# Patient Record
Sex: MALE | Race: WHITE | NOT HISPANIC OR LATINO | URBAN - METROPOLITAN AREA
[De-identification: names, ages, dates, MRNs, and addresses within clinical notes are randomized per-mention and may not be internally consistent; named-entity substitution may affect disease eponyms.]

---

## 2018-06-12 ENCOUNTER — EMERGENCY (EMERGENCY)
Facility: HOSPITAL | Age: 83
LOS: 1 days | Discharge: HOME | End: 2018-06-12
Admitting: EMERGENCY MEDICINE
Payer: MEDICARE

## 2018-06-12 VITALS
HEART RATE: 90 BPM | TEMPERATURE: 98 F | RESPIRATION RATE: 16 BRPM | SYSTOLIC BLOOD PRESSURE: 162 MMHG | DIASTOLIC BLOOD PRESSURE: 90 MMHG

## 2018-06-12 DIAGNOSIS — N40.0 BENIGN PROSTATIC HYPERPLASIA WITHOUT LOWER URINARY TRACT SYMPTOMS: Chronic | ICD-10-CM

## 2018-06-12 DIAGNOSIS — Z98.89 OTHER SPECIFIED POSTPROCEDURAL STATES: Chronic | ICD-10-CM

## 2018-06-12 DIAGNOSIS — Z90.79 ACQUIRED ABSENCE OF OTHER GENITAL ORGAN(S): Chronic | ICD-10-CM

## 2018-06-12 DIAGNOSIS — Z98.49 CATARACT EXTRACTION STATUS, UNSPECIFIED EYE: Chronic | ICD-10-CM

## 2018-06-12 PROCEDURE — 99283 EMERGENCY DEPT VISIT LOW MDM: CPT

## 2018-06-12 NOTE — ED PROVIDER NOTE - OBJECTIVE STATEMENT
86 y/o male c/o pain to old surgical wound. Pt follows with Dr. Rausch who advised pt to present to ER for eval. Pt admits to pain to area, denies drinaage, n/v/d, fever or chills.

## 2018-06-12 NOTE — ED ADULT TRIAGE NOTE - CHIEF COMPLAINT QUOTE
Pt c/o infected suture line to abd.  S/P cholecystectomy 2 years ago.  Sent by Dr. Rausch.  Denies fever or drainage.

## 2018-06-12 NOTE — ED PROVIDER NOTE - MEDICAL DECISION MAKING DETAILS
84 y/o male w/ abscess to old surgical wound- pt seen and eval by Dr. Rausch in ER, preformed I&D. Pt is stable for dc, does not rec abx. pt to f/u with Dr. Rausch

## 2018-06-12 NOTE — ED PROVIDER NOTE - PMH
Diabetes mellitus    Diabetes mellitus type 2 in nonobese    ED (erectile dysfunction)    Gout    HLD (hyperlipidemia)    Hyperlipidemia    Hypertension    Prostate CA    Prostate cancer

## 2018-06-12 NOTE — CONSULT NOTE ADULT - SUBJECTIVE AND OBJECTIVE BOX
Patient had an open cholecystectomy in April of 2016. Developed an area of intermittent drainage from the [id number] of the incision three months ago, and a second area over the medial end of the incision two weeks ago that got progressively more swollen and painful.  Physical Exam: Afebrile.  Abdomen: 1) Erythematous fluctuant area over the medial end of the incision. @) Area of breakdown over the midportion of the incision.  Impression: Two suture granulomas; one with an abscess.  Procedure: 1% lidocaine infiltrated over both areas. Two prolene sutures removed- one from each area. Purulent material drained from the medial site. Skin left open over at both sites.  Plan: Shower tomorrow. Daily dressing changes.

## 2020-11-15 ENCOUNTER — INPATIENT (INPATIENT)
Facility: HOSPITAL | Age: 85
LOS: 1 days | Discharge: HOME | End: 2020-11-17
Attending: SPECIALIST | Admitting: SPECIALIST
Payer: MEDICARE

## 2020-11-15 VITALS
SYSTOLIC BLOOD PRESSURE: 176 MMHG | TEMPERATURE: 98 F | HEART RATE: 103 BPM | RESPIRATION RATE: 18 BRPM | HEIGHT: 70 IN | DIASTOLIC BLOOD PRESSURE: 105 MMHG | OXYGEN SATURATION: 98 %

## 2020-11-15 DIAGNOSIS — Z90.79 ACQUIRED ABSENCE OF OTHER GENITAL ORGAN(S): Chronic | ICD-10-CM

## 2020-11-15 DIAGNOSIS — N40.0 BENIGN PROSTATIC HYPERPLASIA WITHOUT LOWER URINARY TRACT SYMPTOMS: Chronic | ICD-10-CM

## 2020-11-15 DIAGNOSIS — Z98.49 CATARACT EXTRACTION STATUS, UNSPECIFIED EYE: Chronic | ICD-10-CM

## 2020-11-15 DIAGNOSIS — Z90.49 ACQUIRED ABSENCE OF OTHER SPECIFIED PARTS OF DIGESTIVE TRACT: Chronic | ICD-10-CM

## 2020-11-15 DIAGNOSIS — Z98.89 OTHER SPECIFIED POSTPROCEDURAL STATES: Chronic | ICD-10-CM

## 2020-11-15 DIAGNOSIS — R10.9 UNSPECIFIED ABDOMINAL PAIN: ICD-10-CM

## 2020-11-15 LAB
ALBUMIN SERPL ELPH-MCNC: 3.9 G/DL — SIGNIFICANT CHANGE UP (ref 3.3–5)
ALP SERPL-CCNC: 83 U/L — SIGNIFICANT CHANGE UP (ref 40–120)
ALT FLD-CCNC: 10 U/L — SIGNIFICANT CHANGE UP (ref 4–41)
ANION GAP SERPL CALC-SCNC: 11 MMO/L — SIGNIFICANT CHANGE UP (ref 7–14)
APPEARANCE UR: CLEAR — SIGNIFICANT CHANGE UP
AST SERPL-CCNC: 14 U/L — SIGNIFICANT CHANGE UP (ref 4–40)
BACTERIA # UR AUTO: NEGATIVE — SIGNIFICANT CHANGE UP
BASE EXCESS BLDV CALC-SCNC: 0.9 MMOL/L — SIGNIFICANT CHANGE UP
BASOPHILS # BLD AUTO: 0.05 K/UL — SIGNIFICANT CHANGE UP (ref 0–0.2)
BASOPHILS NFR BLD AUTO: 0.7 % — SIGNIFICANT CHANGE UP (ref 0–2)
BILIRUB SERPL-MCNC: 0.4 MG/DL — SIGNIFICANT CHANGE UP (ref 0.2–1.2)
BILIRUB UR-MCNC: NEGATIVE — SIGNIFICANT CHANGE UP
BLOOD GAS VENOUS - CREATININE: 1.8 MG/DL — HIGH (ref 0.5–1.3)
BLOOD GAS VENOUS - FIO2: 21 — SIGNIFICANT CHANGE UP
BLOOD UR QL VISUAL: NEGATIVE — SIGNIFICANT CHANGE UP
BUN SERPL-MCNC: 48 MG/DL — HIGH (ref 7–23)
CALCIUM SERPL-MCNC: 9.5 MG/DL — SIGNIFICANT CHANGE UP (ref 8.4–10.5)
CHLORIDE BLDV-SCNC: 104 MMOL/L — SIGNIFICANT CHANGE UP (ref 96–108)
CHLORIDE SERPL-SCNC: 99 MMOL/L — SIGNIFICANT CHANGE UP (ref 98–107)
CO2 SERPL-SCNC: 22 MMOL/L — SIGNIFICANT CHANGE UP (ref 22–31)
COLOR SPEC: YELLOW — SIGNIFICANT CHANGE UP
CREAT SERPL-MCNC: 1.78 MG/DL — HIGH (ref 0.5–1.3)
EOSINOPHIL # BLD AUTO: 0.09 K/UL — SIGNIFICANT CHANGE UP (ref 0–0.5)
EOSINOPHIL NFR BLD AUTO: 1.2 % — SIGNIFICANT CHANGE UP (ref 0–6)
GAS PNL BLDV: 133 MMOL/L — LOW (ref 136–146)
GLUCOSE BLDC GLUCOMTR-MCNC: 116 MG/DL — HIGH (ref 70–99)
GLUCOSE BLDC GLUCOMTR-MCNC: 135 MG/DL — HIGH (ref 70–99)
GLUCOSE BLDC GLUCOMTR-MCNC: 161 MG/DL — HIGH (ref 70–99)
GLUCOSE BLDC GLUCOMTR-MCNC: 92 MG/DL — SIGNIFICANT CHANGE UP (ref 70–99)
GLUCOSE BLDV-MCNC: 153 MG/DL — HIGH (ref 70–99)
GLUCOSE SERPL-MCNC: 171 MG/DL — HIGH (ref 70–99)
GLUCOSE UR-MCNC: NEGATIVE — SIGNIFICANT CHANGE UP
HCO3 BLDV-SCNC: 24 MMOL/L — SIGNIFICANT CHANGE UP (ref 20–27)
HCT VFR BLD CALC: 35.8 % — LOW (ref 39–50)
HCT VFR BLDV CALC: 38.3 % — LOW (ref 39–51)
HGB BLD-MCNC: 11.9 G/DL — LOW (ref 13–17)
HGB BLDV-MCNC: 12.5 G/DL — LOW (ref 13–17)
HYALINE CASTS # UR AUTO: NEGATIVE — SIGNIFICANT CHANGE UP
IMM GRANULOCYTES NFR BLD AUTO: 0.5 % — SIGNIFICANT CHANGE UP (ref 0–1.5)
KETONES UR-MCNC: NEGATIVE — SIGNIFICANT CHANGE UP
LACTATE BLDV-MCNC: 1.2 MMOL/L — SIGNIFICANT CHANGE UP (ref 0.5–2)
LEUKOCYTE ESTERASE UR-ACNC: NEGATIVE — SIGNIFICANT CHANGE UP
LIDOCAIN IGE QN: 31.3 U/L — SIGNIFICANT CHANGE UP (ref 7–60)
LYMPHOCYTES # BLD AUTO: 1.12 K/UL — SIGNIFICANT CHANGE UP (ref 1–3.3)
LYMPHOCYTES # BLD AUTO: 15.2 % — SIGNIFICANT CHANGE UP (ref 13–44)
MCHC RBC-ENTMCNC: 28.3 PG — SIGNIFICANT CHANGE UP (ref 27–34)
MCHC RBC-ENTMCNC: 33.2 % — SIGNIFICANT CHANGE UP (ref 32–36)
MCV RBC AUTO: 85.2 FL — SIGNIFICANT CHANGE UP (ref 80–100)
MONOCYTES # BLD AUTO: 0.54 K/UL — SIGNIFICANT CHANGE UP (ref 0–0.9)
MONOCYTES NFR BLD AUTO: 7.3 % — SIGNIFICANT CHANGE UP (ref 2–14)
NEUTROPHILS # BLD AUTO: 5.55 K/UL — SIGNIFICANT CHANGE UP (ref 1.8–7.4)
NEUTROPHILS NFR BLD AUTO: 75.1 % — SIGNIFICANT CHANGE UP (ref 43–77)
NITRITE UR-MCNC: NEGATIVE — SIGNIFICANT CHANGE UP
NRBC # FLD: 0 K/UL — SIGNIFICANT CHANGE UP (ref 0–0)
PCO2 BLDV: 43 MMHG — SIGNIFICANT CHANGE UP (ref 41–51)
PH BLDV: 7.39 PH — SIGNIFICANT CHANGE UP (ref 7.32–7.43)
PH UR: 6.5 — SIGNIFICANT CHANGE UP (ref 5–8)
PLATELET # BLD AUTO: 211 K/UL — SIGNIFICANT CHANGE UP (ref 150–400)
PMV BLD: 10.5 FL — SIGNIFICANT CHANGE UP (ref 7–13)
PO2 BLDV: 19 MMHG — LOW (ref 35–40)
POTASSIUM BLDV-SCNC: 4.5 MMOL/L — SIGNIFICANT CHANGE UP (ref 3.4–4.5)
POTASSIUM SERPL-MCNC: 4.9 MMOL/L — SIGNIFICANT CHANGE UP (ref 3.5–5.3)
POTASSIUM SERPL-SCNC: 4.9 MMOL/L — SIGNIFICANT CHANGE UP (ref 3.5–5.3)
PROT SERPL-MCNC: 7.8 G/DL — SIGNIFICANT CHANGE UP (ref 6–8.3)
PROT UR-MCNC: 70 — SIGNIFICANT CHANGE UP
RBC # BLD: 4.2 M/UL — SIGNIFICANT CHANGE UP (ref 4.2–5.8)
RBC # FLD: 15.2 % — HIGH (ref 10.3–14.5)
RBC CASTS # UR COMP ASSIST: SIGNIFICANT CHANGE UP (ref 0–?)
SAO2 % BLDV: 26.3 % — LOW (ref 60–85)
SARS-COV-2 RNA SPEC QL NAA+PROBE: SIGNIFICANT CHANGE UP
SODIUM SERPL-SCNC: 132 MMOL/L — LOW (ref 135–145)
SP GR SPEC: 1.01 — SIGNIFICANT CHANGE UP (ref 1–1.04)
SQUAMOUS # UR AUTO: SIGNIFICANT CHANGE UP
UROBILINOGEN FLD QL: NORMAL — SIGNIFICANT CHANGE UP
WBC # BLD: 7.39 K/UL — SIGNIFICANT CHANGE UP (ref 3.8–10.5)
WBC # FLD AUTO: 7.39 K/UL — SIGNIFICANT CHANGE UP (ref 3.8–10.5)
WBC UR QL: SIGNIFICANT CHANGE UP (ref 0–?)

## 2020-11-15 PROCEDURE — 99222 1ST HOSP IP/OBS MODERATE 55: CPT

## 2020-11-15 PROCEDURE — 74177 CT ABD & PELVIS W/CONTRAST: CPT | Mod: 26

## 2020-11-15 PROCEDURE — 99284 EMERGENCY DEPT VISIT MOD MDM: CPT

## 2020-11-15 PROCEDURE — 99232 SBSQ HOSP IP/OBS MODERATE 35: CPT

## 2020-11-15 RX ORDER — SODIUM CHLORIDE 9 MG/ML
1000 INJECTION, SOLUTION INTRAVENOUS
Refills: 0 | Status: DISCONTINUED | OUTPATIENT
Start: 2020-11-15 | End: 2020-11-17

## 2020-11-15 RX ORDER — DEXTROSE 50 % IN WATER 50 %
15 SYRINGE (ML) INTRAVENOUS ONCE
Refills: 0 | Status: DISCONTINUED | OUTPATIENT
Start: 2020-11-15 | End: 2020-11-17

## 2020-11-15 RX ORDER — SODIUM CHLORIDE 9 MG/ML
1000 INJECTION INTRAMUSCULAR; INTRAVENOUS; SUBCUTANEOUS ONCE
Refills: 0 | Status: COMPLETED | OUTPATIENT
Start: 2020-11-15 | End: 2020-11-15

## 2020-11-15 RX ORDER — LISINOPRIL 2.5 MG/1
10 TABLET ORAL DAILY
Refills: 0 | Status: DISCONTINUED | OUTPATIENT
Start: 2020-11-15 | End: 2020-11-17

## 2020-11-15 RX ORDER — GLUCAGON INJECTION, SOLUTION 0.5 MG/.1ML
1 INJECTION, SOLUTION SUBCUTANEOUS ONCE
Refills: 0 | Status: DISCONTINUED | OUTPATIENT
Start: 2020-11-15 | End: 2020-11-17

## 2020-11-15 RX ORDER — INSULIN LISPRO 100/ML
VIAL (ML) SUBCUTANEOUS EVERY 6 HOURS
Refills: 0 | Status: DISCONTINUED | OUTPATIENT
Start: 2020-11-15 | End: 2020-11-16

## 2020-11-15 RX ORDER — SODIUM CHLORIDE 9 MG/ML
1000 INJECTION, SOLUTION INTRAVENOUS
Refills: 0 | Status: DISCONTINUED | OUTPATIENT
Start: 2020-11-15 | End: 2020-11-16

## 2020-11-15 RX ORDER — ENOXAPARIN SODIUM 100 MG/ML
40 INJECTION SUBCUTANEOUS DAILY
Refills: 0 | Status: DISCONTINUED | OUTPATIENT
Start: 2020-11-15 | End: 2020-11-17

## 2020-11-15 RX ORDER — DEXTROSE 50 % IN WATER 50 %
12.5 SYRINGE (ML) INTRAVENOUS ONCE
Refills: 0 | Status: DISCONTINUED | OUTPATIENT
Start: 2020-11-15 | End: 2020-11-17

## 2020-11-15 RX ORDER — DEXTROSE 50 % IN WATER 50 %
25 SYRINGE (ML) INTRAVENOUS ONCE
Refills: 0 | Status: DISCONTINUED | OUTPATIENT
Start: 2020-11-15 | End: 2020-11-17

## 2020-11-15 RX ORDER — INFLUENZA VIRUS VACCINE 15; 15; 15; 15 UG/.5ML; UG/.5ML; UG/.5ML; UG/.5ML
0.5 SUSPENSION INTRAMUSCULAR ONCE
Refills: 0 | Status: DISCONTINUED | OUTPATIENT
Start: 2020-11-15 | End: 2020-11-17

## 2020-11-15 RX ORDER — PANTOPRAZOLE SODIUM 20 MG/1
40 TABLET, DELAYED RELEASE ORAL DAILY
Refills: 0 | Status: DISCONTINUED | OUTPATIENT
Start: 2020-11-15 | End: 2020-11-17

## 2020-11-15 RX ORDER — LISINOPRIL 2.5 MG/1
10 TABLET ORAL DAILY
Refills: 0 | Status: DISCONTINUED | OUTPATIENT
Start: 2020-11-15 | End: 2020-11-15

## 2020-11-15 RX ADMIN — SODIUM CHLORIDE 120 MILLILITER(S): 9 INJECTION, SOLUTION INTRAVENOUS at 13:36

## 2020-11-15 RX ADMIN — SODIUM CHLORIDE 1000 MILLILITER(S): 9 INJECTION INTRAMUSCULAR; INTRAVENOUS; SUBCUTANEOUS at 05:13

## 2020-11-15 RX ADMIN — LISINOPRIL 10 MILLIGRAM(S): 2.5 TABLET ORAL at 14:53

## 2020-11-15 NOTE — ED ADULT TRIAGE NOTE - CHIEF COMPLAINT QUOTE
Pt reporting to the ED for RUQ pain for 1 hour waking him from sleep. Pt had US of kidney 2 weeks ago due to elevated BUN and creatinine. Pt states "right kidney is no good". Denies dysuria, n/v/d. PMH of HTN, HLD, DM (), HLD, Prostate cancer, not on chemo or radiation at this time.

## 2020-11-15 NOTE — ED PROVIDER NOTE - PSH
BPH (benign prostatic hypertrophy)  S/P Greenlight laser of the prostate  H/O cystoscopy    S/P cataract surgery  left eye  S/P TURP

## 2020-11-15 NOTE — ED ADULT NURSE REASSESSMENT NOTE - NS ED NURSE REASSESS COMMENT FT1
Received pt from previous RN pt a/ox4,  states he wants to go home. Pt speaking in full sentences, breathing unlabored, calm and cooperative, 20g IV LAC patent and no pain at site. Bed in lowest position, surg MD at bedside. Will continue to assess.

## 2020-11-15 NOTE — ED PROVIDER NOTE - ATTENDING CONTRIBUTION TO CARE
Attending Attestation: Dr. Mcclain  I have personally performed a history and physical examination of the patient and discussed management with the resident as well as the patient.  I reviewed the resident's note and agree with the documented findings and plan of care.  I have authored and modified critical sections of the Provider Note, including but not limited to HPI, Physical Exam and MDM. 87 yr old M PMHx includes prostate ca, s/p cholecystectomy 4 yrs ago p/w sharp RUQ pain x1hr PTA. DDx includes PUD, gastritis, pancreatitis, renal colic, less likely cardiac/vascular in nature. Plan: labs, CT A/P, pain control, IVF, reassess.

## 2020-11-15 NOTE — CONSULT NOTE ADULT - ASSESSMENT
Impression:     Impression:    #Gastric wall thickening: Suspect peptic ulcer disease vs. malignancy vs. less likely infiltrative disease; less likely cause of acute onset abdominal pain.    #Intraabdominal enhancing lesion  #Type 2 diabetes    Recommendations:  - keep NPO at midnight for upper endoscopy with gastric biopsy tomorrow  - start pantoprazole 40 mg IV daily  - pain control/supportive care per primary team      Zina Barfield PGY-5  Gastroenterology Fellow  Page #[phone]   Page #[phone] 5pm-7am on weekdays, and on weekends   Impression:    #Gastric wall thickening: Suspect peptic ulcer disease vs. malignancy vs. less likely infiltrative disease; less likely cause of acute onset abdominal pain.    #Intraabdominal enhancing lesion  #Type 2 diabetes    Recommendations:  - keep NPO at midnight for upper endoscopy +/- EUS tomorrow  - start pantoprazole 40 mg IV daily  - pain control/supportive care per primary team      Zina Barfield PGY-5  Gastroenterology Fellow  Page #[phone]   Page #[phone] 5pm-7am on weekdays, and on weekends

## 2020-11-15 NOTE — H&P ADULT - NSICDXPASTSURGICALHX_GEN_ALL_CORE_FT
PAST SURGICAL HISTORY:  BPH (benign prostatic hypertrophy) S/P Greenlight laser of the prostate    H/O cystoscopy     S/P cataract surgery left eye    S/P cholecystectomy Open 2016    S/P TURP

## 2020-11-15 NOTE — ED PROVIDER NOTE - CLINICAL SUMMARY MEDICAL DECISION MAKING FREE TEXT BOX
Arash, PGY2 - 87M PMH includes prostate ca, s/p cholecystectomy remotely p/w RUQ pain x1hr PTA. DDx includes PUD, gastritis, pancreatitis, renal colic, less likely cardiac/vascular in nature. Plan: labs, CT A/P 87 yr old M PMHx includes prostate ca, s/p cholecystectomy 4 yrs ago p/w sharp RUQ pain x1hr PTA. DDx includes PUD, gastritis, pancreatitis, renal colic, less likely cardiac/vascular in nature. Plan: labs, CT A/P, pain control, IVF, reassess.

## 2020-11-15 NOTE — ED ADULT NURSE NOTE - INTERVENTIONS DEFINITIONS
Call bell, personal items and telephone within reach/Monitor for mental status changes and reorient to person, place, and time/Non-slip footwear when patient is off stretcher/Monitor gait and stability/Stretcher in lowest position, wheels locked, appropriate side rails in place/[place] to call system/Instruct patient to call for assistance

## 2020-11-15 NOTE — ED PROVIDER NOTE - PHYSICAL EXAMINATION
I have reviewed the triage vital signs.  Const: AAOx3, in NAD  Eyes: no conjunctival injection  HENT: NCAT, Neck supple, oral mucosa moist  CV: RRR, +S1, S2  Resp: CTAB, no respiratory distress  GI: Abdomen soft, +RUQ TTP, no guarding, no CVA tenderness  Extremities: No peripheral edema,  2+ radial and DP pulses  Skin: Warm, well perfused, no rash  MSK: No gross deformities appreciated  Neuro: No focal sensory or motor deficits  Psych: Appropriate mood and affect

## 2020-11-15 NOTE — ED PROVIDER NOTE - NS ED ROS FT
General: Denies fevers or chills  Eyes: Denies vision changes  ENMT: Denies trouble swallowing or speaking, or sore throat  CV: Denies chest pain or palpitations  Resp: Denies cough or SOB  GI: +Abdominal pain. No nausea, vomiting, diarrhea, or constipation   : Denies painful urination, increased urinary frequency, or blood in urine  Skin: Denies new rashes  Neuro: Denies headache, numbness, or weakness  MSK: Denies recent trauma

## 2020-11-15 NOTE — CONSULT NOTE ADULT - ATTENDING COMMENTS
88 yo M s/p pacheco presenting with abdominal pain found to have abnormal CT scan with gastric wall thickening and 5.8 cm potential lesion in the perihepatic space.  Unclear which is primary, if truly malignant.  Need EGD +/- EUS.

## 2020-11-15 NOTE — PATIENT PROFILE ADULT - HAVE YOU EXPERIENCED VIOLENCE OR A TRAUMATIC EVENT?
no [Well Nourished] : well nourished [No Acute Distress] : no acute distress [Well-Appearing] : well-appearing [Well Developed] : well developed [PERRL] : pupils equal round and reactive to light [Normal Sclera/Conjunctiva] : normal sclera/conjunctiva [Normal Outer Ear/Nose] : the outer ears and nose were normal in appearance [EOMI] : extraocular movements intact [Normal Oropharynx] : the oropharynx was normal [No Lymphadenopathy] : no lymphadenopathy [No JVD] : no jugular venous distention [Supple] : supple [Thyroid Normal, No Nodules] : the thyroid was normal and there were no nodules present [No Respiratory Distress] : no respiratory distress  [No Accessory Muscle Use] : no accessory muscle use [Clear to Auscultation] : lungs were clear to auscultation bilaterally [Regular Rhythm] : with a regular rhythm [Normal Rate] : normal rate  [Normal S1, S2] : normal S1 and S2 [No Murmur] : no murmur heard [No Carotid Bruits] : no carotid bruits [No Abdominal Bruit] : a ~M bruit was not heard ~T in the abdomen [No Varicosities] : no varicosities [Pedal Pulses Present] : the pedal pulses are present [No Edema] : there was no peripheral edema [No Palpable Aorta] : no palpable aorta [No Extremity Clubbing/Cyanosis] : no extremity clubbing/cyanosis [Non Tender] : non-tender [Soft] : abdomen soft [Non-distended] : non-distended [No Masses] : no abdominal mass palpated [No HSM] : no HSM [Normal Bowel Sounds] : normal bowel sounds [Normal Anterior Cervical Nodes] : no anterior cervical lymphadenopathy [Normal Posterior Cervical Nodes] : no posterior cervical lymphadenopathy [No CVA Tenderness] : no CVA  tenderness [No Spinal Tenderness] : no spinal tenderness [No Joint Swelling] : no joint swelling [Grossly Normal Strength/Tone] : grossly normal strength/tone [No Rash] : no rash [Coordination Grossly Intact] : coordination grossly intact [No Focal Deficits] : no focal deficits [Normal Gait] : normal gait [Deep Tendon Reflexes (DTR)] : deep tendon reflexes were 2+ and symmetric [Normal Insight/Judgement] : insight and judgment were intact [Normal Affect] : the affect was normal [de-identified] : syndactylyl [de-identified] : Flattened nasal bridge

## 2020-11-15 NOTE — H&P ADULT - ASSESSMENT
87 Year old male with history of DM 2, HLD, Prostate CA s/p TURP, open cholecystomy (4/2016 by Dr. Rausch), presents with 1 day of RUQ pain, now improved, found to have 5.7 x 1.7 x 5.8 cm enhancing lesion concern for abscess vs mass. Patient symptoms can be related to the lesion vs gastric ulcer.     Plan  - BP control with home meds  - NPO  - IVF  - GI consult - team emailed  - Lovenox for dvt ppx GFR > 30  - ISS  - Plan discussed with Attending, Dr. Shalom Heaton MD  General Surgery, PGY 2

## 2020-11-15 NOTE — H&P ADULT - NSHPLABSRESULTS_GEN_ALL_CORE
11.9   7.39  )-----------( 211      ( 15 Nov 2020 04:00 )             35.8   11-15    132<L>  |  99  |  48<H>  ----------------------------<  171<H>  4.9   |  22  |  1.78<H>    Ca    9.5      15 Nov 2020 04:00    TPro  7.8  /  Alb  3.9  /  TBili  0.4  /  DBili  x   /  AST  14  /  ALT  10  /  AlkPhos  83  11-15  Urinalysis Basic - ( 15 Nov 2020 04:40 )    Color: YELLOW / Appearance: CLEAR / S.011 / pH: 6.5  Gluc: NEGATIVE / Ketone: NEGATIVE  / Bili: NEGATIVE / Urobili: NORMAL   Blood: NEGATIVE / Protein: 70 / Nitrite: NEGATIVE   Leuk Esterase: NEGATIVE / RBC: 0-2 / WBC 0-2   Sq Epi: OCC / Non Sq Epi: x / Bacteria: NEGATIVE    < from: CT Abdomen and Pelvis w/ IV Cont (11.15.20 @ 05:28) >    IMPRESSION:    A 5.7 x 1.7 x 5.8 cm peripherally enhancing lesion in the inferior perihepatic space may represent abscess or metastasis.    Antral gastritis with suggestion of a small gastric ulcer. No free air.    Diverticulosis.          < end of copied text >

## 2020-11-15 NOTE — CONSULT NOTE ADULT - SUBJECTIVE AND OBJECTIVE BOX
Chief Complaint:  Patient is a 87y old  Male who presents with a chief complaint of RUQ collection (15 Nov 2020 09:00)      HPI:        Allergies:  No Known Allergies      Home Medications:    · 	Januvia 50 mg oral tablet: Last Dose Taken:  , 1 tab(s) orally once a day at dinner time  · 	lovastatin 20 mg oral tablet: Last Dose Taken:  , 1 tab(s) orally once a day  · 	ramipril 2.5 mg oral capsule: Last Dose Taken:  , 1 cap(s) orally once a day  · 	zinc acetate 50 mg oral capsule: Last Dose Taken:  , 1 cap(s) orally once a day  · 	glyBURIDE micronized 1.5 mg oral tablet: Last Dose Taken:  , 1 tab(s) orally 3 times a day  · 	allopurinol 300 mg oral tablet: Last Dose Taken:  , 1 tab(s) orally once a day      Hospital Medications:  dextrose 40% Gel 15 Gram(s) Oral once  dextrose 5%. 1000 milliLiter(s) IV Continuous <Continuous>  dextrose 5%. 1000 milliLiter(s) IV Continuous <Continuous>  dextrose 50% Injectable 25 Gram(s) IV Push once  dextrose 50% Injectable 12.5 Gram(s) IV Push once  dextrose 50% Injectable 25 Gram(s) IV Push once  enoxaparin Injectable 40 milliGRAM(s) SubCutaneous daily  glucagon  Injectable 1 milliGRAM(s) IntraMuscular once  insulin lispro (ADMELOG) corrective regimen sliding scale   SubCutaneous every 6 hours  lactated ringers. 1000 milliLiter(s) IV Continuous <Continuous>  lisinopril 10 milliGRAM(s) Oral daily      PMHX/PSHX:  HLD (hyperlipidemia)    Diabetes mellitus type 2 in nonobese    Prostate CA    Gout    Diabetes mellitus    ED (erectile dysfunction)    Hypertension    Hyperlipidemia    Prostate cancer    S/P cholecystectomy    S/P TURP    No significant past surgical history    BPH (benign prostatic hypertrophy)    H/O cystoscopy    S/P cataract surgery        Family history:  No pertinent family history in first degree relatives        Social History:     ROS:     General:  No weight loss, fevers, chills, night sweats, fatigue  Eyes:  No vision changes, no yellowing of eyes   ENT:  No throat pain, runny nose  CV:  No chest pain, palpitations  Resp:  No SOB, cough, wheezing  GI:  See HPI  :  No burning with urination, no hematuria   Muscle:  No muscle pain, weakness  Neuro:  No numbness/tingling, memory problems  Psych:  No fatigue, insomnia, mood problems  Heme:  No easy bruisability  Skin:  No rash, itching       PHYSICAL EXAM:     GENERAL:  Appears stated age, well-groomed, well-nourished, no distress  HEENT:  NC/AT,  conjunctivae clear and pink,  no JVD  CHEST:  Full & symmetric excursion, no increased effort, breath sounds clear  HEART:  Regular rhythm, S1, S2, no murmur/rub/S3/S4, no abdominal bruit, no edema  ABDOMEN:  Soft, non-tender, non-distended, normoactive bowel sounds,  no masses ,  EXTREMITIES:  no cyanosis,clubbing or edema  SKIN:  No rash/erythema/ecchymoses/petechiae/wounds/abscess/warm/dry  NEURO:  Alert, oriented    Vital Signs:  Vital Signs Last 24 Hrs  T(C): 36.8 (15 Nov 2020 14:40), Max: 36.8 (15 Nov 2020 09:29)  T(F): 98.2 (15 Nov 2020 14:40), Max: 98.3 (15 Nov 2020 09:29)  HR: 90 (15 Nov 2020 14:40) (79 - 103)  BP: 160/100 (15 Nov 2020 14:40) (117/67 - 176/105)  BP(mean): --  RR: 18 (15 Nov 2020 14:40) (16 - 18)  SpO2: 98% (15 Nov 2020 14:40) (98% - 100%)  Daily Height in cm: 177.8 (15 Nov 2020 03:06)    Daily     LABS:                        11.9   7.39  )-----------( 211      ( 15 Nov 2020 04:00 )             35.8     11-15    132<L>  |  99  |  48<H>  ----------------------------<  171<H>  4.9   |  22  |  1.78<H>    Ca    9.5      15 Nov 2020 04:00    TPro  7.8  /  Alb  3.9  /  TBili  0.4  /  DBili  x   /  AST  14  /  ALT  10  /  AlkPhos  83  11-15    LIVER FUNCTIONS - ( 15 Nov 2020 04:00 )  Alb: 3.9 g/dL / Pro: 7.8 g/dL / ALK PHOS: 83 u/L / ALT: 10 u/L / AST: 14 u/L / GGT: x             Urinalysis Basic - ( 15 Nov 2020 04:40 )    Color: YELLOW / Appearance: CLEAR / S.011 / pH: 6.5  Gluc: NEGATIVE / Ketone: NEGATIVE  / Bili: NEGATIVE / Urobili: NORMAL   Blood: NEGATIVE / Protein: 70 / Nitrite: NEGATIVE   Leuk Esterase: NEGATIVE / RBC: 0-2 / WBC 0-2   Sq Epi: OCC / Non Sq Epi: x / Bacteria: NEGATIVE      Amylase Serum--      Lipase serum31.3       Ammonia--      Imaging:      EXAM:  CT ABDOMEN AND PELVIS IC        PROCEDURE DATE:  Nov 15 2020         INTERPRETATION:  Abdominal/Pelvic CT    11/15/2020 5:30 AM    Indication: Right upper quadrant pain    Technique: Axial images were obtained following IV contrast from the lung bases through pubic symphysis.  90 cc of Omnipaque 350 was administered intravenously without complication and 10 cc was discarded.  Reformatted coronal and sagittal images are submitted.    Comparison: None    FINDINGS:    LUNG BASES:  There areno pleural effusions. Coronary atherosclerosis.  PERITONEUM: Between the right peritoneal wall and the perihepatic space, there is a 5.7 x 1.7  x 5.8 cm peripherally enhancing lesion. There is no free air.  No free fluid.  LIVER: A subcentimeter lucency in the inferior right lobe.  SPLEEN: Normal.  GALLBLADDER: Cholecystectomy.  BILIARY TREE: Unremarkable.  PANCREAS: Atrophic.  ADRENAL GLANDS: Normal.  KIDNEYS: Bilateral renal cysts.  BOWEL: The stomach is incompletely distended with mild thickening of the distal gastric antrum. There appears to be a 1.3 cm gastric antral ulcer.  There is no small bowel obstruction, focal bowel wall thickening or diverticulitis. The appendix is unremarkable. The sigmoid colon is moderately tortuous with extensive diverticulosis. No significant fecal load.    URINARY BLADDER: Incompletely distended.  PELVIC ORGANS: The prostate gland is enlarged, measuring 6.2 x 5.7 x 6 cm.    There is no significant adenopathy.  VASCULATURE: Aorta is not dilated. Mild atherosclerotic vascular calcification.  RETROPERITONEUM:  There is no mass.  BONES: Bilateral L5 pars defects. Chronic degenerative changes of the spine.  ABDOMINAL WALL: Tiny fat-containing umbilical hernia. Small fat-containing right inguinal hernia..    IMPRESSION:    A 5.7 x 1.7 x 5.8 cm peripherally enhancing lesion in the inferior perihepatic space may represent abscess or metastasis.    Antral gastritis with suggestion of a small gastric ulcer. No free air.    Diverticulosis.              CRISTOBAL ALEX MD; Attending Radiologist  This document has been electronically signed. Nov 15 2020  5:49AM               Chief Complaint:  Patient is a 87y old  Male who presents with a chief complaint of RUQ collection (15 Nov 2020 09:00)      HPI:    87 year old man with type 2 diabetes, HLD, prostate CA s/p TURP and open cholecystectomy 2016 presents for acute onset of RUQ abdominal pain starting earlier today. Pain was severe and non-radiating. Not associated with fever, chills, nausea, vomiting or change in bowel habits. Paint persistent for approx. 3 hours and then resolved. Patient reports minimal pain at this time. CT abdomen/pelvis was performed upon arrival revealing gastric thickening in the antrum and enhancing lesions localized to RUQ. Gastroenterology was subsequently consulted for evaluation of gastric wall thickening. Patient denies heartburn but reports prior nasal laryngoscopy with findings indicative of reflex. No aspirin or other NSAID use. No history of melena or upper endoscopy. He reports a normal colonoscopy in 2018. No known family history of esophageal, gastric or colon cancer. No changes in appetite, early satiety, abdominal bloat or unintentional weight loss. Patient reports approx. 20 lb weight loss after cholecystectomy in 2016 which he has since maintained.      Allergies:  No Known Allergies      Home Medications:    · 	Januvia 50 mg oral tablet: Last Dose Taken:  , 1 tab(s) orally once a day at dinner time  · 	lovastatin 20 mg oral tablet: Last Dose Taken:  , 1 tab(s) orally once a day  · 	ramipril 2.5 mg oral capsule: Last Dose Taken:  , 1 cap(s) orally once a day  · 	zinc acetate 50 mg oral capsule: Last Dose Taken:  , 1 cap(s) orally once a day  · 	glyBURIDE micronized 1.5 mg oral tablet: Last Dose Taken:  , 1 tab(s) orally 3 times a day  · 	allopurinol 300 mg oral tablet: Last Dose Taken:  , 1 tab(s) orally once a day      Hospital Medications:  dextrose 40% Gel 15 Gram(s) Oral once  dextrose 5%. 1000 milliLiter(s) IV Continuous <Continuous>  dextrose 5%. 1000 milliLiter(s) IV Continuous <Continuous>  dextrose 50% Injectable 25 Gram(s) IV Push once  dextrose 50% Injectable 12.5 Gram(s) IV Push once  dextrose 50% Injectable 25 Gram(s) IV Push once  enoxaparin Injectable 40 milliGRAM(s) SubCutaneous daily  glucagon  Injectable 1 milliGRAM(s) IntraMuscular once  insulin lispro (ADMELOG) corrective regimen sliding scale   SubCutaneous every 6 hours  lactated ringers. 1000 milliLiter(s) IV Continuous <Continuous>  lisinopril 10 milliGRAM(s) Oral daily      PMHX/PSHX:  HLD (hyperlipidemia)    Diabetes mellitus type 2 in nonobese    Prostate CA    Gout    Diabetes mellitus    ED (erectile dysfunction)    Hypertension    Hyperlipidemia    Prostate cancer    S/P cholecystectomy    S/P TURP    No significant past surgical history    BPH (benign prostatic hypertrophy)    H/O cystoscopy    S/P cataract surgery        Family history:  No pertinent family history in first degree relatives        Social History:     ROS:     General:  No weight loss, fevers, chills, night sweats, fatigue  Eyes:  No vision changes, no yellowing of eyes   ENT:  No throat pain, runny nose  CV:  No chest pain, palpitations  Resp:  No SOB, cough, wheezing  GI:  See HPI  :  No burning with urination, no hematuria   Muscle:  No muscle pain, weakness  Neuro:  No numbness/tingling, memory problems  Psych:  No fatigue, insomnia, mood problems  Heme:  No easy bruisability  Skin:  No rash, itching       PHYSICAL EXAM:     GENERAL:  Elderly, well-developed,  no distress  HEENT:  NC/AT,  conjunctivae clear and pink,  no JVD  CHEST:  No increased effort,   HEART:  Regular rhythm & rate  ABDOMEN:  Soft, non-distended +RLQ well healed surgical incision +minimally tender RUQ  EXTREMITIES:  no cyanosis,clubbing or edema  SKIN:  No rash/erythema/ecchymoses/petechiae/jaundice   NEURO:  Alert, orientedx3    Vital Signs:  Vital Signs Last 24 Hrs  T(C): 36.8 (15 Nov 2020 14:40), Max: 36.8 (15 Nov 2020 09:29)  T(F): 98.2 (15 Nov 2020 14:40), Max: 98.3 (15 Nov 2020 09:29)  HR: 90 (15 Nov 2020 14:40) (79 - 103)  BP: 160/100 (15 Nov 2020 14:40) (117/67 - 176/105)  BP(mean): --  RR: 18 (15 Nov 2020 14:40) (16 - 18)  SpO2: 98% (15 Nov 2020 14:40) (98% - 100%)  Daily Height in cm: 177.8 (15 Nov 2020 03:06)    Daily     LABS:                        11.9   7.39  )-----------( 211      ( 15 Nov 2020 04:00 )             35.8     11-15    132<L>  |  99  |  48<H>  ----------------------------<  171<H>  4.9   |  22  |  1.78<H>    Ca    9.5      15 Nov 2020 04:00    TPro  7.8  /  Alb  3.9  /  TBili  0.4  /  DBili  x   /  AST  14  /  ALT  10  /  AlkPhos  83  11-15    LIVER FUNCTIONS - ( 15 Nov 2020 04:00 )  Alb: 3.9 g/dL / Pro: 7.8 g/dL / ALK PHOS: 83 u/L / ALT: 10 u/L / AST: 14 u/L / GGT: x             Urinalysis Basic - ( 15 Nov 2020 04:40 )    Color: YELLOW / Appearance: CLEAR / S.011 / pH: 6.5  Gluc: NEGATIVE / Ketone: NEGATIVE  / Bili: NEGATIVE / Urobili: NORMAL   Blood: NEGATIVE / Protein: 70 / Nitrite: NEGATIVE   Leuk Esterase: NEGATIVE / RBC: 0-2 / WBC 0-2   Sq Epi: OCC / Non Sq Epi: x / Bacteria: NEGATIVE      Amylase Serum--      Lipase serum31.3       Ammonia--      Imaging:      EXAM:  CT ABDOMEN AND PELVIS IC        PROCEDURE DATE:  Nov 15 2020         INTERPRETATION:  Abdominal/Pelvic CT    11/15/2020 5:30 AM    Indication: Right upper quadrant pain    Technique: Axial images were obtained following IV contrast from the lung bases through pubic symphysis.  90 cc of Omnipaque 350 was administered intravenously without complication and 10 cc was discarded.  Reformatted coronal and sagittal images are submitted.    Comparison: None    FINDINGS:    LUNG BASES:  There areno pleural effusions. Coronary atherosclerosis.  PERITONEUM: Between the right peritoneal wall and the perihepatic space, there is a 5.7 x 1.7  x 5.8 cm peripherally enhancing lesion. There is no free air.  No free fluid.  LIVER: A subcentimeter lucency in the inferior right lobe.  SPLEEN: Normal.  GALLBLADDER: Cholecystectomy.  BILIARY TREE: Unremarkable.  PANCREAS: Atrophic.  ADRENAL GLANDS: Normal.  KIDNEYS: Bilateral renal cysts.  BOWEL: The stomach is incompletely distended with mild thickening of the distal gastric antrum. There appears to be a 1.3 cm gastric antral ulcer.  There is no small bowel obstruction, focal bowel wall thickening or diverticulitis. The appendix is unremarkable. The sigmoid colon is moderately tortuous with extensive diverticulosis. No significant fecal load.    URINARY BLADDER: Incompletely distended.  PELVIC ORGANS: The prostate gland is enlarged, measuring 6.2 x 5.7 x 6 cm.    There is no significant adenopathy.  VASCULATURE: Aorta is not dilated. Mild atherosclerotic vascular calcification.  RETROPERITONEUM:  There is no mass.  BONES: Bilateral L5 pars defects. Chronic degenerative changes of the spine.  ABDOMINAL WALL: Tiny fat-containing umbilical hernia. Small fat-containing right inguinal hernia..    IMPRESSION:    A 5.7 x 1.7 x 5.8 cm peripherally enhancing lesion in the inferior perihepatic space may represent abscess or metastasis.    Antral gastritis with suggestion of a small gastric ulcer. No free air.    Diverticulosis.              CRISTOBAL ALEX MD; Attending Radiologist  This document has been electronically signed. Nov 15 2020  5:49AM

## 2020-11-15 NOTE — H&P ADULT - ATTENDING COMMENTS
Above noted. History obtained, the patient was examined.  Developed sudden onset of right upper quadrant abdominal pain at 1 AM. Denies nausea, emesis, dyspepsia, chills, fever or weight loss. The pain has nearly resolved at this time.  Past Medical and Surgical Histories: As above.  Physical Exam: Vital signs are stable. Afebrile.  Abdomen: Soft, minimal discomfort on palpation of the right upper quadrant. No palpable mass.  Labs: As charted.  CT Scan: Enhancing right sided abdominal collection. Possible gastric ulcer/mass.  Plan: NPO. Upper endoscopy tomorrow.

## 2020-11-15 NOTE — H&P ADULT - NSHPPHYSICALEXAM_GEN_ALL_CORE
General: well developed, well nourished, NAD  Neuro: alert and oriented, no focal deficits, moves all extremities spontaneously  HEENT: NCAT, no lymphadenopathy  Respiratory: airway patent, respirations unlabored  CVS: regular rate and rhythm  Abdomen: soft, nontender, nondistended, Right subcostal incision healed well   Extremities: no edema, sensation and movement grossly intact  Skin: warm, dry, appropriate color

## 2020-11-15 NOTE — ED ADULT NURSE NOTE - OBJECTIVE STATEMENT
RN facilitator: pt received to room 15, ambulatory with steady gait, accompanied by daughter c/o RUQ abdominal pain that woke pt from sleep at 1 am. at this item pt called daughter who brought him to ER. abdomen soft, non distended tender to RUQ. denies nausea, vomiting, diarrhea, fever, chills. reports he recently has ultrasound of kidneys but has not heard results yet. PSH cholecystectomy. handoff report given to MD Arash Frost at bedside for evaluation.

## 2020-11-15 NOTE — ED PROVIDER NOTE - OBJECTIVE STATEMENT
87M PMH HTN, HLD, DM, prostate ca (not on chemo/rad), PSH cholecystectomy p/w RUQ pain waking him from sleep x 1hr. Pain described as nonradiating. No f/c, n/v, flank pain, groin pain, urinary sx, diarrhea. No CP or SOB. Of note, pt found to have elevated Cr to 2.1 on outpatient labs last month. Had a US kidneys performed last week with unknown results. PMSARTHAK is a nephrologist at [facility]. 87M c HTN, HLD, DM, prostate ca (not on chemo/rad), PSH cholecystectomy 4 yrs ago p/w RUQ pain waking him from sleep x 1hr. Pain described as sharp, non-radiating. Worse with laying on right side and specific movements. No f/c, n/v, flank pain, groin pain, urinary sx, diarrhea. No CP or SOB. Of note, pt found to have elevated Cr to 2.1 on outpatient labs last month. Had a US kidneys performed last week with unknown results. PMD is a nephrologist at [facility].  No recent unintentional weight changes.

## 2020-11-15 NOTE — H&P ADULT - NSICDXPASTMEDICALHX_GEN_ALL_CORE_FT
PAST MEDICAL HISTORY:  Diabetes mellitus     Diabetes mellitus type 2 in nonobese     ED (erectile dysfunction)     Gout     HLD (hyperlipidemia)     Hyperlipidemia     Hypertension     Prostate CA     Prostate cancer

## 2020-11-16 ENCOUNTER — RESULT REVIEW (OUTPATIENT)
Age: 85
End: 2020-11-16

## 2020-11-16 LAB
ANION GAP SERPL CALC-SCNC: 13 MMO/L — SIGNIFICANT CHANGE UP (ref 7–14)
APTT BLD: 33.2 SEC — SIGNIFICANT CHANGE UP (ref 27–36.3)
BLD GP AB SCN SERPL QL: NEGATIVE — SIGNIFICANT CHANGE UP
BUN SERPL-MCNC: 34 MG/DL — HIGH (ref 7–23)
CALCIUM SERPL-MCNC: 9.7 MG/DL — SIGNIFICANT CHANGE UP (ref 8.4–10.5)
CHLORIDE SERPL-SCNC: 101 MMOL/L — SIGNIFICANT CHANGE UP (ref 98–107)
CO2 SERPL-SCNC: 21 MMOL/L — LOW (ref 22–31)
CREAT SERPL-MCNC: 1.58 MG/DL — HIGH (ref 0.5–1.3)
GLUCOSE BLDC GLUCOMTR-MCNC: 103 MG/DL — HIGH (ref 70–99)
GLUCOSE BLDC GLUCOMTR-MCNC: 208 MG/DL — HIGH (ref 70–99)
GLUCOSE BLDC GLUCOMTR-MCNC: 90 MG/DL — SIGNIFICANT CHANGE UP (ref 70–99)
GLUCOSE BLDC GLUCOMTR-MCNC: 97 MG/DL — SIGNIFICANT CHANGE UP (ref 70–99)
GLUCOSE SERPL-MCNC: 85 MG/DL — SIGNIFICANT CHANGE UP (ref 70–99)
HBA1C BLD-MCNC: 7.3 % — HIGH (ref 4–5.6)
HCT VFR BLD CALC: 38.8 % — LOW (ref 39–50)
HGB BLD-MCNC: 12.3 G/DL — LOW (ref 13–17)
INR BLD: 1.16 — SIGNIFICANT CHANGE UP (ref 0.88–1.16)
MAGNESIUM SERPL-MCNC: 2 MG/DL — SIGNIFICANT CHANGE UP (ref 1.6–2.6)
MCHC RBC-ENTMCNC: 27.6 PG — SIGNIFICANT CHANGE UP (ref 27–34)
MCHC RBC-ENTMCNC: 31.7 % — LOW (ref 32–36)
MCV RBC AUTO: 87.2 FL — SIGNIFICANT CHANGE UP (ref 80–100)
NRBC # FLD: 0 K/UL — SIGNIFICANT CHANGE UP (ref 0–0)
PHOSPHATE SERPL-MCNC: 3.3 MG/DL — SIGNIFICANT CHANGE UP (ref 2.5–4.5)
PLATELET # BLD AUTO: 225 K/UL — SIGNIFICANT CHANGE UP (ref 150–400)
PMV BLD: 11.1 FL — SIGNIFICANT CHANGE UP (ref 7–13)
POTASSIUM SERPL-MCNC: 4.4 MMOL/L — SIGNIFICANT CHANGE UP (ref 3.5–5.3)
POTASSIUM SERPL-SCNC: 4.4 MMOL/L — SIGNIFICANT CHANGE UP (ref 3.5–5.3)
PROTHROM AB SERPL-ACNC: 13.2 SEC — SIGNIFICANT CHANGE UP (ref 10.6–13.6)
RBC # BLD: 4.45 M/UL — SIGNIFICANT CHANGE UP (ref 4.2–5.8)
RBC # FLD: 15.5 % — HIGH (ref 10.3–14.5)
RH IG SCN BLD-IMP: POSITIVE — SIGNIFICANT CHANGE UP
SODIUM SERPL-SCNC: 135 MMOL/L — SIGNIFICANT CHANGE UP (ref 135–145)
WBC # BLD: 7.44 K/UL — SIGNIFICANT CHANGE UP (ref 3.8–10.5)
WBC # FLD AUTO: 7.44 K/UL — SIGNIFICANT CHANGE UP (ref 3.8–10.5)

## 2020-11-16 PROCEDURE — 88341 IMHCHEM/IMCYTCHM EA ADD ANTB: CPT | Mod: 26

## 2020-11-16 PROCEDURE — 88312 SPECIAL STAINS GROUP 1: CPT | Mod: 26

## 2020-11-16 PROCEDURE — 43239 EGD BIOPSY SINGLE/MULTIPLE: CPT | Mod: GC

## 2020-11-16 PROCEDURE — 88342 IMHCHEM/IMCYTCHM 1ST ANTB: CPT | Mod: 26

## 2020-11-16 PROCEDURE — 88305 TISSUE EXAM BY PATHOLOGIST: CPT | Mod: 26,59

## 2020-11-16 RX ORDER — INSULIN LISPRO 100/ML
VIAL (ML) SUBCUTANEOUS
Refills: 0 | Status: DISCONTINUED | OUTPATIENT
Start: 2020-11-16 | End: 2020-11-16

## 2020-11-16 RX ORDER — INSULIN LISPRO 100/ML
VIAL (ML) SUBCUTANEOUS AT BEDTIME
Refills: 0 | Status: DISCONTINUED | OUTPATIENT
Start: 2020-11-16 | End: 2020-11-17

## 2020-11-16 RX ORDER — INSULIN LISPRO 100/ML
VIAL (ML) SUBCUTANEOUS
Refills: 0 | Status: DISCONTINUED | OUTPATIENT
Start: 2020-11-16 | End: 2020-11-17

## 2020-11-16 RX ADMIN — SODIUM CHLORIDE 120 MILLILITER(S): 9 INJECTION, SOLUTION INTRAVENOUS at 12:39

## 2020-11-16 RX ADMIN — ENOXAPARIN SODIUM 40 MILLIGRAM(S): 100 INJECTION SUBCUTANEOUS at 12:41

## 2020-11-16 RX ADMIN — PANTOPRAZOLE SODIUM 40 MILLIGRAM(S): 20 TABLET, DELAYED RELEASE ORAL at 12:39

## 2020-11-16 RX ADMIN — LISINOPRIL 10 MILLIGRAM(S): 2.5 TABLET ORAL at 04:59

## 2020-11-16 NOTE — CHART NOTE - NSCHARTNOTEFT_GEN_A_CORE
POST-OPERATIVE NOTE    Subjective: Patient seen at bedside this evening. Reports that he is feeling well, without complaints. Tolerating regular diet without nausea or vomiting.     Patient is s/p endoscopy with GI. Recovering appropriately.     Vital Signs Last 24 Hrs  T(C): 37 (16 Nov 2020 22:13), Max: 37.4 (16 Nov 2020 12:30)  T(F): 98.6 (16 Nov 2020 22:13), Max: 99.3 (16 Nov 2020 12:30)  HR: 88 (16 Nov 2020 22:13) (70 - 99)  BP: 103/65 (16 Nov 2020 22:13) (103/65 - 153/79)  BP(mean): --  RR: 16 (16 Nov 2020 22:13) (16 - 25)  SpO2: 98% (16 Nov 2020 22:13) (96% - 100%)  I&O's Detail    15 Nov 2020 07:01  -  16 Nov 2020 07:00  --------------------------------------------------------  IN:    Lactated Ringers: 1800 mL  Total IN: 1800 mL    OUT:    Oral Fluid: 0 mL    Voided (mL): 300 mL  Total OUT: 300 mL    Total NET: 1500 mL      16 Nov 2020 07:01  -  17 Nov 2020 00:29  --------------------------------------------------------  IN:    Lactated Ringers: 900 mL    Oral Fluid: 240 mL  Total IN: 1140 mL    OUT:    Voided (mL): 200 mL  Total OUT: 200 mL    Total NET: 940 mL        enoxaparin Injectable 40  lisinopril 10    PAST MEDICAL & SURGICAL HISTORY:  HLD (hyperlipidemia)    Diabetes mellitus type 2 in nonobese    Prostate CA    Gout    Diabetes mellitus    ED (erectile dysfunction)    Hypertension    Hyperlipidemia    Prostate cancer    S/P cholecystectomy  Open 2016    S/P TURP    BPH (benign prostatic hypertrophy)  S/P Greenlight laser of the prostate    H/O cystoscopy    S/P cataract surgery  left eye      Physical Exam:   GEN: resting in bed comfortably in NAD  RESP: no increased WOB  ABD: soft, non-distended, non-tender without rebound tenderness or guarding  EXTR: warm, well-perfused without gross deformities; spontaneous movement in b/l U/L extrem  NEURO: AAOx4     LABS:                        12.3   7.44  )-----------( 225      ( 16 Nov 2020 05:20 )             38.8     11-16    135  |  101  |  34<H>  ----------------------------<  85  4.4   |  21<L>  |  1.58<H>    Ca    9.7      16 Nov 2020 05:20  Phos  3.3     11-16  Mg     2.0     11-16    TPro  7.8  /  Alb  3.9  /  TBili  0.4  /  DBili  x   /  AST  14  /  ALT  10  /  AlkPhos  83  11-15    PT/INR - ( 16 Nov 2020 05:20 )   PT: 13.2 SEC;   INR: 1.16          PTT - ( 16 Nov 2020 05:20 )  PTT:33.2 SEC  CAPILLARY BLOOD GLUCOSE      POCT Blood Glucose.: 208 mg/dL (16 Nov 2020 21:50)  POCT Blood Glucose.: 103 mg/dL (16 Nov 2020 17:22)  POCT Blood Glucose.: 97 mg/dL (16 Nov 2020 12:29)  POCT Blood Glucose.: 90 mg/dL (16 Nov 2020 05:32)      Assessment:  The patient is a 87y Male who is now several hours post-op from a endoscopy with GI. Patient tolerated procedure well, recovered in PACU uneventfully, now clinically stable on floors.     Plan:  - Pain control as needed  - DVT ppx  - OOB and ambulating as tolerated  - F/u AM labs      Janae Castillo, PGY-1  B Team Surgery  #[id number]

## 2020-11-16 NOTE — CHART NOTE - NSCHARTNOTEFT_GEN_A_CORE
CAPRINI SCORE    AGE RELATED RISK FACTORS                                                             [ ] Age 41-60 years                                            (1 Point)  [ ] Age: 61-74 years                                           (2 Points)                 [3 ] Age= 75 years                                                (3 Points)             DISEASE RELATED RISK FACTORS                                                       [ ] Edema in the lower extremities                 (1 Point)                     [ ] Varicose veins                                               (1 Point)                                 [ ] BMI > 25 Kg/m2                                            (1 Point)                                  [ ] Serious infection (ie PNA)                            (1 Point)                     [ ] Lung disease ( COPD, Emphysema)            (1 Point)                                                                          [ ] Acute myocardial infarction                         (1 Point)                  [ ] Congestive heart failure (in the previous month)  (1 Point)         [ ] Inflammatory bowel disease                            (1 Point)                  [ ] Central venous access, PICC or Port               (2 points)       (within the last month)                                                                [ ] Stroke (in the previous month)                        (5 Points)    [2 ] Previous or present malignancy                       (2 points)                                                                                                                                                         HEMATOLOGY RELATED FACTORS                                                         [ ] Prior episodes of VTE                                     (3 Points)                     [ ] Positive family history for VTE                      (3 Points)                  [ ] Prothrombin 84201 A                                     (3 Points)                     [ ] Factor V Leiden                                                (3 Points)                        [ ] Lupus anticoagulants                                      (3 Points)                                                           [ ] Anticardiolipin antibodies                              (3 Points)                                                       [ ] High homocysteine in the blood                   (3 Points)                                             [ ] Other congenital or acquired thrombophilia      (3 Points)                                                [ ] Heparin induced thrombocytopenia                  (3 Points)                                        MOBILITY RELATED FACTORS  [ ] Bed rest                                                         (1 Point)  [ ] Plaster cast                                                    (2 points)  [ ] Bed bound for more than 72 hours           (2 Points)    GENDER SPECIFIC FACTORS  [ ] Pregnancy or had a baby within the last month   (1 Point)  [ ] Post-partum < 6 weeks                                   (1 Point)  [ ] Hormonal therapy  or oral contraception   (1 Point)  [ ] History of pregnancy complications              (1 point)  [ ] Unexplained or recurrent              (1 Point)    OTHER RISK FACTORS                                           (1 Point)  BMI >40, smoking, diabetes requiring insulin, chemotherapy  blood transfusions and length of surgery over 2 hours    SURGERY RELATED RISK FACTORS  [ ]  Section within the last month     (1 Point)  [ ] Minor surgery                                                  (1 Point)  [ ] Arthroscopic surgery                                       (2 Points)  [ ] Planned major surgery lasting more            (2 Points)      than 45 minutes     [ ] Elective hip or knee joint replacement       (5 points)       surgery                                                TRAUMA RELATED RISK FACTORS  [ ] Fracture of the hip, pelvis, or leg                       (5 Points)  [ ] Spinal cord injury resulting in paralysis             (5 points)       (in the previous month)    [ ] Paralysis  (less than 1 month)                             (5 Points)  [ ] Multiple Trauma within 1 month                        (5 Points)    Total Score [  5      ]    Caprini Score 0-2: Low Risk, NO VTE prophylaxis required for most patients, encourage ambulation  Caprini Score 3-6: Moderate Risk , pharmacologic VTE prophylaxis is indicated for most patients (in the absence of contraindications)  Caprini Score Greater than or =7: High risk, pharmocologic VTE prophylaxis indicated for mot patients (in the absence of contraindications)

## 2020-11-16 NOTE — PROGRESS NOTE ADULT - SUBJECTIVE AND OBJECTIVE BOX
SUBJECTIVE: Seen and examined on morning rounds with team. Patient resting comfortably. Denies N/V.       OBJECTIVE:  Vital Signs Last 24 Hrs  T(C): 36.8 (16 Nov 2020 09:36), Max: 36.9 (15 Nov 2020 21:29)  T(F): 98.3 (16 Nov 2020 09:36), Max: 98.4 (15 Nov 2020 21:29)  HR: 93 (16 Nov 2020 09:36) (70 - 93)  BP: 130/80 (16 Nov 2020 09:36) (125/78 - 160/100)  BP(mean): --  RR: 17 (16 Nov 2020 09:36) (17 - 18)  SpO2: 98% (16 Nov 2020 09:36) (98% - 100%)    PHYSICAL EXAMINATION:  General: well developed, well nourished, NAD  Neuro: alert and oriented, no focal deficits, moves all extremities spontaneously  HEENT: NCAT, no lymphadenopathy  Respiratory: airway patent, respirations unlabored  CVS: regular rate and rhythm  Abdomen: soft, nontender, nondistended, Right subcostal incision healed well   Extremities: no edema, sensation and movement grossly intact  Skin: warm, dry, appropriate color    LABS:                        12.3   7.44  )-----------( 225      ( 16 Nov 2020 05:20 )             38.8       11-16    135  |  101  |  34<H>  ----------------------------<  85  4.4   |  21<L>  |  1.58<H>    Ca    9.7      16 Nov 2020 05:20  Phos  3.3     11-16  Mg     2.0     11-16    TPro  7.8  /  Alb  3.9  /  TBili  0.4  /  DBili  x   /  AST  14  /  ALT  10  /  AlkPhos  83  11-15        IMAGING:  < from: CT Abdomen and Pelvis w/ IV Cont (11.15.20 @ 05:28) >  IMPRESSION:    A 5.7 x 1.7 x 5.8 cm peripherally enhancing lesion in the inferior perihepatic space may represent abscess or metastasis.    Antral gastritis with suggestion of a small gastric ulcer. No free air.    Diverticulosis.    < end of copied text >

## 2020-11-16 NOTE — PROGRESS NOTE ADULT - ASSESSMENT
87 Year old male with history of DM 2, HLD, Prostate CA s/p TURP, open cholecystomy (4/2016 by Dr. Rausch), presents with 1 day of RUQ pain, now improved, found to have 5.7 x 1.7 x 5.8 cm enhancing lesion concern for abscess vs mass. Patient symptoms can be related to the lesion vs gastric ulcer.     Plan  - EGD/EUS per GI today   - BP control with home meds  - NPO  - IVF  - Lovenox for dvt ppx  - ISS      B Team   [id number]

## 2020-11-17 ENCOUNTER — TRANSCRIPTION ENCOUNTER (OUTPATIENT)
Age: 85
End: 2020-11-17

## 2020-11-17 VITALS
RESPIRATION RATE: 17 BRPM | SYSTOLIC BLOOD PRESSURE: 113 MMHG | TEMPERATURE: 98 F | DIASTOLIC BLOOD PRESSURE: 67 MMHG | HEART RATE: 98 BPM | OXYGEN SATURATION: 99 %

## 2020-11-17 LAB
ALBUMIN SERPL ELPH-MCNC: 3.6 G/DL — SIGNIFICANT CHANGE UP (ref 3.3–5)
ALP SERPL-CCNC: 65 U/L — SIGNIFICANT CHANGE UP (ref 40–120)
ALT FLD-CCNC: 8 U/L — SIGNIFICANT CHANGE UP (ref 4–41)
ANION GAP SERPL CALC-SCNC: 13 MMO/L — SIGNIFICANT CHANGE UP (ref 7–14)
AST SERPL-CCNC: 14 U/L — SIGNIFICANT CHANGE UP (ref 4–40)
BILIRUB DIRECT SERPL-MCNC: < 0.2 MG/DL — SIGNIFICANT CHANGE UP (ref 0.1–0.2)
BILIRUB SERPL-MCNC: 0.6 MG/DL — SIGNIFICANT CHANGE UP (ref 0.2–1.2)
BLD GP AB SCN SERPL QL: NEGATIVE — SIGNIFICANT CHANGE UP
BUN SERPL-MCNC: 33 MG/DL — HIGH (ref 7–23)
CALCIUM SERPL-MCNC: 9.1 MG/DL — SIGNIFICANT CHANGE UP (ref 8.4–10.5)
CHLORIDE SERPL-SCNC: 101 MMOL/L — SIGNIFICANT CHANGE UP (ref 98–107)
CO2 SERPL-SCNC: 21 MMOL/L — LOW (ref 22–31)
CREAT SERPL-MCNC: 1.7 MG/DL — HIGH (ref 0.5–1.3)
GLUCOSE BLDC GLUCOMTR-MCNC: 120 MG/DL — HIGH (ref 70–99)
GLUCOSE BLDC GLUCOMTR-MCNC: 237 MG/DL — HIGH (ref 70–99)
GLUCOSE SERPL-MCNC: 110 MG/DL — HIGH (ref 70–99)
HCT VFR BLD CALC: 33.6 % — LOW (ref 39–50)
HGB BLD-MCNC: 11 G/DL — LOW (ref 13–17)
MAGNESIUM SERPL-MCNC: 1.7 MG/DL — SIGNIFICANT CHANGE UP (ref 1.6–2.6)
MCHC RBC-ENTMCNC: 27.8 PG — SIGNIFICANT CHANGE UP (ref 27–34)
MCHC RBC-ENTMCNC: 32.7 % — SIGNIFICANT CHANGE UP (ref 32–36)
MCV RBC AUTO: 85.1 FL — SIGNIFICANT CHANGE UP (ref 80–100)
NRBC # FLD: 0 K/UL — SIGNIFICANT CHANGE UP (ref 0–0)
PHOSPHATE SERPL-MCNC: 3.2 MG/DL — SIGNIFICANT CHANGE UP (ref 2.5–4.5)
PLATELET # BLD AUTO: 212 K/UL — SIGNIFICANT CHANGE UP (ref 150–400)
PMV BLD: 11.3 FL — SIGNIFICANT CHANGE UP (ref 7–13)
POTASSIUM SERPL-MCNC: 4.2 MMOL/L — SIGNIFICANT CHANGE UP (ref 3.5–5.3)
POTASSIUM SERPL-SCNC: 4.2 MMOL/L — SIGNIFICANT CHANGE UP (ref 3.5–5.3)
PROT SERPL-MCNC: 6.6 G/DL — SIGNIFICANT CHANGE UP (ref 6–8.3)
RBC # BLD: 3.95 M/UL — LOW (ref 4.2–5.8)
RBC # FLD: 15.2 % — HIGH (ref 10.3–14.5)
RH IG SCN BLD-IMP: POSITIVE — SIGNIFICANT CHANGE UP
SODIUM SERPL-SCNC: 135 MMOL/L — SIGNIFICANT CHANGE UP (ref 135–145)
WBC # BLD: 6.71 K/UL — SIGNIFICANT CHANGE UP (ref 3.8–10.5)
WBC # FLD AUTO: 6.71 K/UL — SIGNIFICANT CHANGE UP (ref 3.8–10.5)

## 2020-11-17 PROCEDURE — 99232 SBSQ HOSP IP/OBS MODERATE 35: CPT | Mod: GC

## 2020-11-17 RX ORDER — MAGNESIUM SULFATE 500 MG/ML
2 VIAL (ML) INJECTION ONCE
Refills: 0 | Status: COMPLETED | OUTPATIENT
Start: 2020-11-17 | End: 2020-11-17

## 2020-11-17 RX ADMIN — Medication 50 GRAM(S): at 10:46

## 2020-11-17 RX ADMIN — LISINOPRIL 10 MILLIGRAM(S): 2.5 TABLET ORAL at 05:33

## 2020-11-17 RX ADMIN — PANTOPRAZOLE SODIUM 40 MILLIGRAM(S): 20 TABLET, DELAYED RELEASE ORAL at 11:46

## 2020-11-17 NOTE — PROGRESS NOTE ADULT - SUBJECTIVE AND OBJECTIVE BOX
Chief Complaint:  Patient is a 87y old  Male who presents with a chief complaint of RUQ collection (17 Nov 2020 09:29)      Interval Events:   - s/p EGD with some nodular gastric mucosa, 2 nodules (biopsied) and gastropathy.   - This AM feels well    Allergies:  No Known Allergies      Hospital Medications:  dextrose 40% Gel 15 Gram(s) Oral once  dextrose 5%. 1000 milliLiter(s) IV Continuous <Continuous>  dextrose 5%. 1000 milliLiter(s) IV Continuous <Continuous>  dextrose 50% Injectable 25 Gram(s) IV Push once  dextrose 50% Injectable 12.5 Gram(s) IV Push once  dextrose 50% Injectable 25 Gram(s) IV Push once  enoxaparin Injectable 40 milliGRAM(s) SubCutaneous daily  glucagon  Injectable 1 milliGRAM(s) IntraMuscular once  influenza   Vaccine 0.5 milliLiter(s) IntraMuscular once  insulin lispro (ADMELOG) corrective regimen sliding scale   SubCutaneous three times a day before meals  insulin lispro (ADMELOG) corrective regimen sliding scale   SubCutaneous at bedtime  lisinopril 10 milliGRAM(s) Oral daily  magnesium sulfate  IVPB 2 Gram(s) IV Intermittent once  pantoprazole  Injectable 40 milliGRAM(s) IV Push daily      PMHX/PSHX:  HLD (hyperlipidemia)    Diabetes mellitus type 2 in nonobese    Prostate CA    Gout    Diabetes mellitus    ED (erectile dysfunction)    Hypertension    Hyperlipidemia    Prostate cancer    S/P cholecystectomy    S/P TURP    No significant past surgical history    BPH (benign prostatic hypertrophy)    H/O cystoscopy    S/P cataract surgery        Family history:  No pertinent family history in first degree relatives        ROS:   General:  No weight loss, fevers, chills, night sweats, fatigue  Eyes:  No vision changes, no yellowing of eyes   ENT:  No throat pain, runny nose  CV:  No chest pain, palpitations  Resp:  No SOB, cough, wheezing  GI:  See HPI  :  No burning with urination, no hematuria   Muscle:  No muscle pain, weakness  Neuro:  No numbness/tingling, memory problems  Psych:  No fatigue, insomnia, mood problems  Heme:  No easy bruisability  Skin:  No rash, itching       PHYSICAL EXAM:   Vital Signs:  Vital Signs Last 24 Hrs  T(C): 36.7 (17 Nov 2020 10:30), Max: 37.4 (16 Nov 2020 12:30)  T(F): 98.1 (17 Nov 2020 10:30), Max: 99.3 (16 Nov 2020 12:30)  HR: 98 (17 Nov 2020 10:30) (61 - 99)  BP: 113/67 (17 Nov 2020 10:30) (97/62 - 153/79)  BP(mean): --  RR: 17 (17 Nov 2020 10:30) (16 - 25)  SpO2: 99% (17 Nov 2020 10:30) (96% - 99%)  Daily Height in cm: 180.3 (16 Nov 2020 13:46)    Daily     GENERAL:  Elderly, well-developed,  no distress  HEENT:  NC/AT,  conjunctivae clear and pink,  no JVD  CHEST:  No increased effort,   HEART:  Regular rhythm & rate  ABDOMEN:  Soft, non-distended +RLQ well healed surgical incision +minimally tender RUQ  EXTREMITIES:  no cyanosis,clubbing or edema  SKIN:  No rash/erythema/ecchymoses/petechiae/jaundice   NEURO:  Alert, orientedx3    LABS:                        11.0   6.71  )-----------( 212      ( 17 Nov 2020 06:00 )             33.6     Mean Cell Volume: 85.1 fL (11-17-20 @ 06:00)    11-17    135  |  101  |  33<H>  ----------------------------<  110<H>  4.2   |  21<L>  |  1.70<H>    Ca    9.1      17 Nov 2020 06:00  Phos  3.2     11-17  Mg     1.7     11-17    TPro  6.6  /  Alb  3.6  /  TBili  0.6  /  DBili  < 0.2  /  AST  14  /  ALT  8   /  AlkPhos  65  11-17    LIVER FUNCTIONS - ( 17 Nov 2020 06:00 )  Alb: 3.6 g/dL / Pro: 6.6 g/dL / ALK PHOS: 65 u/L / ALT: 8 u/L / AST: 14 u/L / GGT: x           PT/INR - ( 16 Nov 2020 05:20 )   PT: 13.2 SEC;   INR: 1.16          PTT - ( 16 Nov 2020 05:20 )  PTT:33.2 SEC                            11.0   6.71  )-----------( 212      ( 17 Nov 2020 06:00 )             33.6                         12.3   7.44  )-----------( 225      ( 16 Nov 2020 05:20 )             38.8                         11.9   7.39  )-----------( 211      ( 15 Nov 2020 04:00 )             35.8       Imaging:

## 2020-11-17 NOTE — PROGRESS NOTE ADULT - ASSESSMENT
87 Year old male with history of DM 2, HLD, Prostate CA s/p TURP, open cholecystomy (4/2016 by Dr. Rausch), presents with 1 day of RUQ pain, now improved, found to have 5.7 x 1.7 x 5.8 cm enhancing lesion concern for abscess vs mass. Patient symptoms can be related to the lesion vs gastric ulcer. S/p EGD/EUS performed by GI on 11/16.     Plan  - EGD/EUS yesterday, fu path  - BP control with home meds  - Regular diet  - IV lock   - Lovenox for dvt ppx  - ISS  - Dispo: possibly home today       B Team   [id number]

## 2020-11-17 NOTE — PROGRESS NOTE ADULT - SUBJECTIVE AND OBJECTIVE BOX
ANESTHESIA POSTOP CHECK    87y Male POSTOP DAY 1 s/p EGD under MAC.    No COMPLAINTS    NO APPARENT ANESTHESIA COMPLICATIONS

## 2020-11-17 NOTE — PROGRESS NOTE ADULT - SUBJECTIVE AND OBJECTIVE BOX
SUBJECTIVE: Seen and examined on morning rounds. Resting comfortably.       OBJECTIVE:  Vital Signs Last 24 Hrs  T(C): 36.9 (17 Nov 2020 05:31), Max: 37.4 (16 Nov 2020 12:30)  T(F): 98.5 (17 Nov 2020 05:31), Max: 99.3 (16 Nov 2020 12:30)  HR: 77 (17 Nov 2020 05:31) (61 - 99)  BP: 126/88 (17 Nov 2020 05:31) (97/62 - 153/79)  BP(mean): --  RR: 16 (17 Nov 2020 05:31) (16 - 25)  SpO2: 96% (17 Nov 2020 05:31) (96% - 98%)    PHYSICAL EXAMINATION:  General: well developed, well nourished, NAD  Neuro: alert and oriented, no focal deficits, moves all extremities spontaneously  HEENT: NCAT, no lymphadenopathy  Respiratory: airway patent, respirations unlabored  CVS: regular rate and rhythm  Abdomen: soft, nontender, nondistended, Right subcostal incision healed well   Extremities: no edema, sensation and movement grossly intact  Skin: warm, dry, appropriate color    LABS:                        11.0   6.71  )-----------( 212      ( 17 Nov 2020 06:00 )             33.6       11-17    135  |  101  |  33<H>  ----------------------------<  110<H>  4.2   |  21<L>  |  1.70<H>    Ca    9.1      17 Nov 2020 06:00  Phos  3.2     11-17  Mg     1.7     11-17    TPro  6.6  /  Alb  3.6  /  TBili  0.6  /  DBili  < 0.2  /  AST  14  /  ALT  8   /  AlkPhos  65  11-17    IMAGING:  Endoscopy 11/16:  Impression:          - Z-line regular, 39 cm from the incisors.                       - Small hiatal hernia.                       - Nodular mucosa in the gastric body. Biopsied.                       - Gastric nodules. Biopsied.                       - Erythematous mucosa in the antrum. Biopsied.                       - No gastric ulcers seen.                       - Normal examined duodenum.

## 2020-11-17 NOTE — DISCHARGE NOTE PROVIDER - HOSPITAL COURSE
87 Year old male with history of DM 2, HLD, Prostate CA s/p TURP, open cholecystomy (4/2016 by Dr. Rausch), presented with RUQ pain. Patient reported pain had started the night of 11/14. Pt described the pain as a sharp/cramp that worsens with siting up right. Patient otherwise denies fever, chills, nausea, vomiting, obstipation. Patient presented to the ED and pain resolved without intervention. Subsequently obtained CT scan of abdomen.   Results:  CT scan showed A 5.7 x 1.7 x 5.8 cm peripherally enhancing lesion in the inferior perihepatic space which may represent abscess or metastasis.  Antral gastritis with suggestion of a small gastric ulcer. No free air.  Diverticulosis.     Subsequently obtained GI consultation for EGD, performed on 11/16. EGD was performed. Patient tolerated the procedure well and was sent back to the surgery floors for better further recovery. Decision was made for the patient to follow up pathology of study as an outpatient.    Patient is eating , walking, and pain is well controlled. At this point in time patient is stable for discharge to home. Patient will follow up with Dr. Rausch within 1-2 weeks.

## 2020-11-17 NOTE — DISCHARGE NOTE PROVIDER - NSDCCPCAREPLAN_GEN_ALL_CORE_FT
PRINCIPAL DISCHARGE DIAGNOSIS  Diagnosis: Abdominal pain  Assessment and Plan of Treatment:       SECONDARY DISCHARGE DIAGNOSES  Diagnosis: Perihepatic abscess  Assessment and Plan of Treatment:     Diagnosis: Gastric ulcer  Assessment and Plan of Treatment:

## 2020-11-17 NOTE — DISCHARGE NOTE PROVIDER - CARE PROVIDER_API CALL
Srikanth Rausch  SURGERY  [address] [place] [address], [address] [place] [address]  Phone: [phone]  Fax: [phone]  Follow Up Time:

## 2020-11-17 NOTE — PROGRESS NOTE ADULT - ATTENDING COMMENTS
Above noted.   Awaiting upper endoscopy to be done today.
S/p EGD yesterday. Doing well.   EUS not performed as right-sided abdominal lesion not amenable to endoscopic intervention/evaluation.  Awaiting path.

## 2020-11-17 NOTE — DISCHARGE NOTE PROVIDER - NSDCFUADDINST_GEN_ALL_CORE_FT
BATHING: Please do not submerge wound underwater. You may shower and/or sponge bathe 48 hours after surgery.  ACTIVITY: No heavy lifting or straining. Otherwise, you may return to your usual level of physical activity. If you are taking narcotic pain medication (such as Oxycodone) DO NOT drive a car, operate machinery or make important decisions.  DIET: Return to your usual diet.  NOTIFY YOUR SURGEON IF: You have any bleeding that does not stop, any pus draining from your wound(s), any fever (over 100.4 F) or chills, persistent nausea/vomiting, persistent diarrhea, or if your pain is not controlled on your discharge pain medications.  FOLLOW-UP: Please follow up with your primary care physician in one week regarding your hospitalization.  Please follow up with your surgeon.  Call today for appointment in 1 week.

## 2020-11-17 NOTE — DISCHARGE NOTE NURSING/CASE MANAGEMENT/SOCIAL WORK - PATIENT PORTAL LINK FT
You can access the FollowMyHealth Patient Portal offered by [facility] by registering at the following website: [email or web address]. By joining [employer]’s FollowMyHealth portal, you will also be able to view your health information using other applications (apps) compatible with our system.

## 2020-11-17 NOTE — DISCHARGE NOTE PROVIDER - NSDCMRMEDTOKEN_GEN_ALL_CORE_FT
allopurinol 300 mg oral tablet: 1 tab(s) orally once a day  glyBURIDE micronized 1.5 mg oral tablet: 1 tab(s) orally 3 times a day  Januvia 50 mg oral tablet: 1 tab(s) orally once a day at dinner time  lovastatin 20 mg oral tablet: 1 tab(s) orally once a day  ramipril 2.5 mg oral capsule: 1 cap(s) orally once a day  zinc acetate 50 mg oral capsule: 1 cap(s) orally once a day

## 2020-11-17 NOTE — PROGRESS NOTE ADULT - ASSESSMENT
# Gastric wall thickening - no ulcers seen, possibly in the settings of recently healed ulcer?   # Perihepatic space lesion - Elida # Gastric wall thickening - no ulcers seen, possibly in the settings of recently healed ulcer?   # Perihepatic space lesion - Unable to assess with an EUS. Might benefit from an IR consult.    Recommendations:  - Await pathology results.  - Please consult IR for possible sampling of the previously identified right-sided abdominal lesion on CT as not amenable to EUS per discussion with advanced endoscopy team    GI will sign off. Please reconsult us as needed.  Thank you for involving us in the care of this patient. Please reach out if any further questions.    Anupam Villarreal, PGY-4  GI Fellow    Available on [employer] Teams  Pager [phone] ([facility]) or [phone] ([facility])  After 5PM/Weekends, please contact the on-call GI fellow: [phone]  Available through [employer] Teams     # Gastric wall thickening - no ulcers seen, possibly in the settings of recently healed ulcer?   # Perihepatic space lesion - Unable to assess with an EUS. Might benefit from an IR consult.    Recommendations:  - Await pathology results.  - Would consider IR consultation for possible sampling of the previously identified right-sided abdominal lesion on CT as not amenable to EUS per discussion with advanced endoscopy team    GI will sign off. Please reconsult us as needed.  Thank you for involving us in the care of this patient. Please reach out if any further questions.    Anupam Villarreal, PGY-4  GI Fellow    Available on [employer] Teams  Pager [phone] ([facility]) or [phone] ([facility])  After 5PM/Weekends, please contact the on-call GI fellow: [phone]  Available through [employer] Teams

## 2020-11-24 LAB — SURGICAL PATHOLOGY STUDY: SIGNIFICANT CHANGE UP

## 2021-08-19 PROBLEM — Z85.46 HISTORY OF MALIGNANT NEOPLASM OF PROSTATE: Status: RESOLVED | Noted: 2021-08-19 | Resolved: 2021-08-19

## 2021-08-23 ENCOUNTER — RESULT REVIEW (OUTPATIENT)
Age: 86
End: 2021-08-23

## 2021-08-23 ENCOUNTER — APPOINTMENT (OUTPATIENT)
Dept: SURGICAL ONCOLOGY | Facility: CLINIC | Age: 86
End: 2021-08-23
Payer: MEDICARE

## 2021-08-23 VITALS
OXYGEN SATURATION: 99 % | SYSTOLIC BLOOD PRESSURE: 156 MMHG | HEART RATE: 92 BPM | DIASTOLIC BLOOD PRESSURE: 79 MMHG | WEIGHT: 154 LBS

## 2021-08-23 DIAGNOSIS — K86.2 CYST OF PANCREAS: ICD-10-CM

## 2021-08-23 DIAGNOSIS — Z85.46 PERSONAL HISTORY OF MALIGNANT NEOPLASM OF PROSTATE: ICD-10-CM

## 2021-08-23 DIAGNOSIS — Z80.42 FAMILY HISTORY OF MALIGNANT NEOPLASM OF PROSTATE: ICD-10-CM

## 2021-08-23 DIAGNOSIS — Z80.8 FAMILY HISTORY OF MALIGNANT NEOPLASM OF OTHER ORGANS OR SYSTEMS: ICD-10-CM

## 2021-08-23 DIAGNOSIS — Z80.3 FAMILY HISTORY OF MALIGNANT NEOPLASM OF BREAST: ICD-10-CM

## 2021-08-23 PROCEDURE — 99204 OFFICE O/P NEW MOD 45 MIN: CPT

## 2021-08-24 ENCOUNTER — NON-APPOINTMENT (OUTPATIENT)
Age: 86
End: 2021-08-24

## 2021-08-24 NOTE — CONSULT LETTER
[Dear  ___] : Dear  [unfilled], [Consult Letter:] : I had the pleasure of evaluating your patient, [unfilled]. [Please see my note below.] : Please see my note below. [Consult Closing:] : Thank you very much for allowing me to participate in the care of this patient.  If you have any questions, please do not hesitate to contact me. [Sincerely,] : Sincerely, [DrPamela  ___] : Dr. SAM [FreeTextEntry2] : Petey Zhong MD  [FreeTextEntry3] : Steffen Latham MD\par Surgical Oncology\par [facility]\par Office: [phone]\par Cell: [phone]\par  06-May-2018

## 2021-08-24 NOTE — PHYSICAL EXAM
[Normal] : supple, no neck mass and thyroid not enlarged [Normal Neck Lymph Nodes] : normal neck lymph nodes  [Normal Supraclavicular Lymph Nodes] : normal supraclavicular lymph nodes [Normal Groin Lymph Nodes] : normal groin lymph nodes [Normal Axillary Lymph Nodes] : normal axillary lymph nodes [Normal] : oriented to person, place and time, with appropriate affect [de-identified] : Right subcostal incision well-healed. No incisional hernia. No peritoneal signs.

## 2021-08-24 NOTE — HISTORY OF PRESENT ILLNESS
[de-identified] : Derrell Grant is an 88 year old male who presents today for an initial consultation. \par \par CT Abdomen and Pelvis 2020: A 5.7 x 1.7 x 5.8 cm peripherally enhancing lesion in the inferior perihepatic space may represent abscess or metastasis. Antral gastritis with suggestion of a small gastric ulcer. No free air. Diverticulosis.\par \par He recently underwent a NM  bone scan in 2021 which demonstrated no scintigraphic evidence of osseous metastases.\par \par MRI Abdomen 21 demonstrated the following:\par  1. Right lateral abdominal wall and chest wall multilocular cystic lesions as described above. \par 2.  Status post cholecystectomy.\par 3.  Nonspecific small cystic lesion in the anterior wall of the distal stomach. This may be sequela of prior instrumentation as well.\par 4.  Multiple tiny to small pancreatic cysts compatible with intraductal papillary mucinous neoplasms. Given patient's age, the usefulness of further follow-up should be based on patient's preference and whether patient is a surgical candidate or not.\par 5.  Upper limits of normal size spleen without obvious mass or cyst.\par 6.  Nonspecific right anterior cardiophrenic angle lymph node.\par 7.  Bilateral renal cysts. No follow-up is needed for the cysts.\par 8.  Scoliosis and spondylosis of the lumbar spine.\par \par He has a past medical history of prostate cancer, HTN, HLD, and DM II.  He is a former smoker. He denies alcohol consumption. He recently has been experiencing right upper quadrant pain for the past few months.  He has experienced a 20 lb. weight loss since 2020. He states he has decreased appetite. He states he has intermittent loose stools and constipation. He has had syncopal episodes in the past, the most recent  was approximately 2 weeks. \par \par He has a family history of cancer in his sister with breast cancer at 30 years old ( at age 32), mother with basal cell carcinoma,father with prostate cancer, and son with melanoma.

## 2021-08-24 NOTE — ASSESSMENT
[FreeTextEntry1] : We discussed the need for a pancreas protocol CT scan and possible aspiration/drainage of the perihepatic collection. I suspect there may be an abscess secondary to intra-abdominal gallstones. I will have his prior imaging reviewed by radiology as well. Mr. Grant and his daughter expressed their desire to avoid any significant interventions at this time but are willing to undergo additional evaluation and work-up.

## 2021-08-28 ENCOUNTER — APPOINTMENT (OUTPATIENT)
Dept: CT IMAGING | Facility: IMAGING CENTER | Age: 86
End: 2021-08-28
Payer: MEDICARE

## 2021-08-28 ENCOUNTER — OUTPATIENT (OUTPATIENT)
Dept: OUTPATIENT SERVICES | Facility: HOSPITAL | Age: 86
LOS: 1 days | End: 2021-08-28
Payer: MEDICARE

## 2021-08-28 DIAGNOSIS — Z98.49 CATARACT EXTRACTION STATUS, UNSPECIFIED EYE: Chronic | ICD-10-CM

## 2021-08-28 DIAGNOSIS — N40.0 BENIGN PROSTATIC HYPERPLASIA WITHOUT LOWER URINARY TRACT SYMPTOMS: Chronic | ICD-10-CM

## 2021-08-28 DIAGNOSIS — K86.2 CYST OF PANCREAS: ICD-10-CM

## 2021-08-28 DIAGNOSIS — K65.0 GENERALIZED (ACUTE) PERITONITIS: ICD-10-CM

## 2021-08-28 DIAGNOSIS — Z90.49 ACQUIRED ABSENCE OF OTHER SPECIFIED PARTS OF DIGESTIVE TRACT: Chronic | ICD-10-CM

## 2021-08-28 DIAGNOSIS — Z98.89 OTHER SPECIFIED POSTPROCEDURAL STATES: Chronic | ICD-10-CM

## 2021-08-28 DIAGNOSIS — Z90.79 ACQUIRED ABSENCE OF OTHER GENITAL ORGAN(S): Chronic | ICD-10-CM

## 2021-08-28 PROCEDURE — 74177 CT ABD & PELVIS W/CONTRAST: CPT | Mod: 26,MH

## 2021-08-28 PROCEDURE — 82565 ASSAY OF CREATININE: CPT

## 2021-08-28 PROCEDURE — 74177 CT ABD & PELVIS W/CONTRAST: CPT

## 2021-08-29 ENCOUNTER — TRANSCRIPTION ENCOUNTER (OUTPATIENT)
Age: 86
End: 2021-08-29

## 2021-09-14 ENCOUNTER — APPOINTMENT (OUTPATIENT)
Dept: INTERVENTIONAL RADIOLOGY/VASCULAR | Facility: CLINIC | Age: 86
End: 2021-09-14
Payer: MEDICARE

## 2021-09-14 VITALS
BODY MASS INDEX: 21.47 KG/M2 | SYSTOLIC BLOOD PRESSURE: 138 MMHG | HEIGHT: 70 IN | WEIGHT: 150 LBS | RESPIRATION RATE: 18 BRPM | OXYGEN SATURATION: 100 % | DIASTOLIC BLOOD PRESSURE: 80 MMHG | HEART RATE: 76 BPM

## 2021-09-14 DIAGNOSIS — Z87.891 PERSONAL HISTORY OF NICOTINE DEPENDENCE: ICD-10-CM

## 2021-09-14 DIAGNOSIS — K65.0 GENERALIZED (ACUTE) PERITONITIS: ICD-10-CM

## 2021-09-14 PROCEDURE — 99204 OFFICE O/P NEW MOD 45 MIN: CPT

## 2021-09-14 RX ORDER — VITAMIN B COMPLEX
CAPSULE ORAL
Refills: 0 | Status: ACTIVE | COMMUNITY

## 2021-09-14 RX ORDER — ALLOPURINOL 200 MG/1
TABLET ORAL
Refills: 0 | Status: ACTIVE | COMMUNITY

## 2021-09-14 RX ORDER — GLYBURIDE 2.5 MG/1
TABLET ORAL
Refills: 0 | Status: ACTIVE | COMMUNITY

## 2021-09-14 RX ORDER — LOVASTATIN 40 MG/1
TABLET ORAL
Refills: 0 | Status: ACTIVE | COMMUNITY

## 2021-09-14 RX ORDER — COLD-HOT PACK
EACH MISCELLANEOUS
Refills: 0 | Status: ACTIVE | COMMUNITY

## 2021-09-14 RX ORDER — SITAGLIPTIN 100 MG/1
TABLET, FILM COATED ORAL
Refills: 0 | Status: ACTIVE | COMMUNITY

## 2021-09-14 RX ORDER — RAMIPRIL 5 MG/1
CAPSULE ORAL
Refills: 0 | Status: ACTIVE | COMMUNITY

## 2021-10-01 ENCOUNTER — OUTPATIENT (OUTPATIENT)
Dept: OUTPATIENT SERVICES | Facility: HOSPITAL | Age: 86
LOS: 1 days | End: 2021-10-01
Payer: MEDICARE

## 2021-10-01 VITALS
HEIGHT: 68 IN | DIASTOLIC BLOOD PRESSURE: 78 MMHG | RESPIRATION RATE: 16 BRPM | WEIGHT: 154.1 LBS | OXYGEN SATURATION: 97 % | HEART RATE: 94 BPM | SYSTOLIC BLOOD PRESSURE: 134 MMHG | TEMPERATURE: 96 F

## 2021-10-01 DIAGNOSIS — K65.0 GENERALIZED (ACUTE) PERITONITIS: ICD-10-CM

## 2021-10-01 DIAGNOSIS — K02.9 DENTAL CARIES, UNSPECIFIED: ICD-10-CM

## 2021-10-01 DIAGNOSIS — N40.0 BENIGN PROSTATIC HYPERPLASIA WITHOUT LOWER URINARY TRACT SYMPTOMS: Chronic | ICD-10-CM

## 2021-10-01 DIAGNOSIS — Z98.49 CATARACT EXTRACTION STATUS, UNSPECIFIED EYE: Chronic | ICD-10-CM

## 2021-10-01 DIAGNOSIS — Z98.89 OTHER SPECIFIED POSTPROCEDURAL STATES: Chronic | ICD-10-CM

## 2021-10-01 DIAGNOSIS — Z90.79 ACQUIRED ABSENCE OF OTHER GENITAL ORGAN(S): Chronic | ICD-10-CM

## 2021-10-01 DIAGNOSIS — Z90.49 ACQUIRED ABSENCE OF OTHER SPECIFIED PARTS OF DIGESTIVE TRACT: Chronic | ICD-10-CM

## 2021-10-01 DIAGNOSIS — K76.89 OTHER SPECIFIED DISEASES OF LIVER: ICD-10-CM

## 2021-10-01 DIAGNOSIS — R54 AGE-RELATED PHYSICAL DEBILITY: ICD-10-CM

## 2021-10-01 LAB
A1C WITH ESTIMATED AVERAGE GLUCOSE RESULT: 6.1 % — HIGH (ref 4–5.6)
ANION GAP SERPL CALC-SCNC: 12 MMOL/L — SIGNIFICANT CHANGE UP (ref 7–14)
APTT BLD: 31.7 SEC — SIGNIFICANT CHANGE UP (ref 27–36.3)
BUN SERPL-MCNC: 63 MG/DL — HIGH (ref 7–23)
CALCIUM SERPL-MCNC: 9.5 MG/DL — SIGNIFICANT CHANGE UP (ref 8.4–10.5)
CHLORIDE SERPL-SCNC: 95 MMOL/L — LOW (ref 98–107)
CO2 SERPL-SCNC: 25 MMOL/L — SIGNIFICANT CHANGE UP (ref 22–31)
CREAT SERPL-MCNC: 2.2 MG/DL — HIGH (ref 0.5–1.3)
ESTIMATED AVERAGE GLUCOSE: 128 — SIGNIFICANT CHANGE UP
GLUCOSE SERPL-MCNC: 137 MG/DL — HIGH (ref 70–99)
HCT VFR BLD CALC: 30.2 % — LOW (ref 39–50)
HGB BLD-MCNC: 10.2 G/DL — LOW (ref 13–17)
INR BLD: 1.26 RATIO — HIGH (ref 0.88–1.16)
MCHC RBC-ENTMCNC: 28.1 PG — SIGNIFICANT CHANGE UP (ref 27–34)
MCHC RBC-ENTMCNC: 33.8 GM/DL — SIGNIFICANT CHANGE UP (ref 32–36)
MCV RBC AUTO: 83.2 FL — SIGNIFICANT CHANGE UP (ref 80–100)
NRBC # BLD: 0 /100 WBCS — SIGNIFICANT CHANGE UP
NRBC # FLD: 0 K/UL — SIGNIFICANT CHANGE UP
PLATELET # BLD AUTO: 257 K/UL — SIGNIFICANT CHANGE UP (ref 150–400)
POTASSIUM SERPL-MCNC: 5.2 MMOL/L — SIGNIFICANT CHANGE UP (ref 3.5–5.3)
POTASSIUM SERPL-SCNC: 5.2 MMOL/L — SIGNIFICANT CHANGE UP (ref 3.5–5.3)
PROTHROM AB SERPL-ACNC: 14.2 SEC — HIGH (ref 10.6–13.6)
RBC # BLD: 3.63 M/UL — LOW (ref 4.2–5.8)
RBC # FLD: 16 % — HIGH (ref 10.3–14.5)
SODIUM SERPL-SCNC: 132 MMOL/L — LOW (ref 135–145)
WBC # BLD: 8.15 K/UL — SIGNIFICANT CHANGE UP (ref 3.8–10.5)
WBC # FLD AUTO: 8.15 K/UL — SIGNIFICANT CHANGE UP (ref 3.8–10.5)

## 2021-10-01 PROCEDURE — 93010 ELECTROCARDIOGRAM REPORT: CPT

## 2021-10-01 NOTE — H&P PST ADULT - OTHER CARE PROVIDERS
cardiologist, Dr. Srikanth Sarah, [address] [place] [address] cardiologist, Dr Ray Sarah  [phone]

## 2021-10-01 NOTE — H&P PST ADULT - REASON FOR ADMISSION
they are doing intervention radiation to work on the area around the liver they are doing intervention radiology to work on the area around the liver

## 2021-10-01 NOTE — H&P PST ADULT - HISTORY OF PRESENT ILLNESS
This is an 87 y/o male who presents with perihepatic collection. Patient developed abdominal pain in end of last year. Eventual CT scan and MRI confirmed pathology. Has h/o cholecystectomy in 2-016. Scheduled for CT guided perihepatic collection drainage This is an 87 y/o male who presents with perihepatic collection. Patient developed abdominal pain in end of last year. Eventual CT scan and MRI confirmed pathology. Has h/o cholecystectomy in 2016. Scheduled for CT guided perihepatic collection drainage

## 2021-10-01 NOTE — H&P PST ADULT - PROBLEM SELECTOR PLAN 3
Has VERY loose lower front tooth. Patient REFUSES to see dentist. Notified surgeon via email and informed patient that anesthesia may cancel his procedure the day of surgery Has VERY loose lower front tooth. Patient REFUSES to see dentist. Discussed with anesthesia, Dr. Nguyen. Notified surgeon via email and informed patient that anesthesia may cancel his procedure the day of surgery due to the loose tooth

## 2021-10-01 NOTE — H&P PST ADULT - NSICDXPASTMEDICALHX_GEN_ALL_CORE_FT
PAST MEDICAL HISTORY:  Diabetes mellitus type II, diagnosed in approximately 2011    ED (erectile dysfunction)     Gout     Hyperlipidemia     Hypertension     Prostate cancer being monitored     PAST MEDICAL HISTORY:  Decreased hearing of right ear     Diabetes mellitus type II, diagnosed in approximately 2011    ED (erectile dysfunction)     Gout     Hyperlipidemia     Hypertension     Inguinal hernia right side in 2021    Perihepatic fluid collection 2021    Prostate cancer being monitored

## 2021-10-01 NOTE — H&P PST ADULT - PROBLEM SELECTOR PLAN 2
Await prearranged medical evaluation with pcp as requested by surgeon and PAST (Jdue to advanced age)  * Await old ekg for comparison and echo done by cardiologist in 2021  * Instructed to not take glyburide micronized the evening before surgery and the am of surgery  * Instructed to take normal am dose of ramipril the am of surgery if due that day Await prearranged medical evaluation with pcp as requested by surgeon and PAST (due to advanced age)  * Await old ekg for comparison and echo done by cardiologist in 2021  * Instructed to not take glyburide micronized the evening before surgery and the am of surgery  * Instructed to take normal am dose of ramipril the am of surgery if due that day

## 2021-10-01 NOTE — H&P PST ADULT - PROBLEM SELECTOR PLAN 1
This is an 89 y/o male who is scheduled for CT guided perihepatic collection drainage on 10-12-21  * Instructed to speak with surgeon regarding covid testing  * Given preop and cleanser instructions with good teach back and patient verbalized understanding

## 2021-10-05 ENCOUNTER — NON-APPOINTMENT (OUTPATIENT)
Age: 86
End: 2021-10-05

## 2021-10-07 ENCOUNTER — NON-APPOINTMENT (OUTPATIENT)
Age: 86
End: 2021-10-07

## 2021-10-09 ENCOUNTER — APPOINTMENT (OUTPATIENT)
Dept: DISASTER EMERGENCY | Facility: CLINIC | Age: 86
End: 2021-10-09

## 2021-10-09 DIAGNOSIS — Z01.818 ENCOUNTER FOR OTHER PREPROCEDURAL EXAMINATION: ICD-10-CM

## 2021-10-10 LAB — SARS-COV-2 N GENE NPH QL NAA+PROBE: NOT DETECTED

## 2021-10-12 ENCOUNTER — RESULT REVIEW (OUTPATIENT)
Age: 86
End: 2021-10-12

## 2021-10-12 ENCOUNTER — INPATIENT (INPATIENT)
Facility: HOSPITAL | Age: 86
LOS: 0 days | Discharge: HOME | End: 2021-10-13
Attending: SURGERY | Admitting: SURGERY
Payer: MEDICARE

## 2021-10-12 VITALS
OXYGEN SATURATION: 100 % | DIASTOLIC BLOOD PRESSURE: 61 MMHG | RESPIRATION RATE: 15 BRPM | HEART RATE: 73 BPM | SYSTOLIC BLOOD PRESSURE: 112 MMHG | TEMPERATURE: 98 F

## 2021-10-12 DIAGNOSIS — N40.0 BENIGN PROSTATIC HYPERPLASIA WITHOUT LOWER URINARY TRACT SYMPTOMS: Chronic | ICD-10-CM

## 2021-10-12 DIAGNOSIS — Z90.79 ACQUIRED ABSENCE OF OTHER GENITAL ORGAN(S): Chronic | ICD-10-CM

## 2021-10-12 DIAGNOSIS — Z90.49 ACQUIRED ABSENCE OF OTHER SPECIFIED PARTS OF DIGESTIVE TRACT: Chronic | ICD-10-CM

## 2021-10-12 DIAGNOSIS — Z98.49 CATARACT EXTRACTION STATUS, UNSPECIFIED EYE: Chronic | ICD-10-CM

## 2021-10-12 DIAGNOSIS — Z98.89 OTHER SPECIFIED POSTPROCEDURAL STATES: Chronic | ICD-10-CM

## 2021-10-12 LAB
ANION GAP SERPL CALC-SCNC: 13 MMOL/L — SIGNIFICANT CHANGE UP (ref 7–14)
APTT BLD: 30 SEC — SIGNIFICANT CHANGE UP (ref 27–36.3)
BASOPHILS # BLD AUTO: 0.03 K/UL — SIGNIFICANT CHANGE UP (ref 0–0.2)
BASOPHILS NFR BLD AUTO: 0.5 % — SIGNIFICANT CHANGE UP (ref 0–2)
BLD GP AB SCN SERPL QL: NEGATIVE — SIGNIFICANT CHANGE UP
BUN SERPL-MCNC: 49 MG/DL — HIGH (ref 7–23)
CALCIUM SERPL-MCNC: 8.7 MG/DL — SIGNIFICANT CHANGE UP (ref 8.4–10.5)
CHLORIDE SERPL-SCNC: 98 MMOL/L — SIGNIFICANT CHANGE UP (ref 98–107)
CO2 SERPL-SCNC: 21 MMOL/L — LOW (ref 22–31)
CREAT SERPL-MCNC: 1.95 MG/DL — HIGH (ref 0.5–1.3)
EOSINOPHIL # BLD AUTO: 0.06 K/UL — SIGNIFICANT CHANGE UP (ref 0–0.5)
EOSINOPHIL NFR BLD AUTO: 1.1 % — SIGNIFICANT CHANGE UP (ref 0–6)
GLUCOSE BLDC GLUCOMTR-MCNC: 133 MG/DL — HIGH (ref 70–99)
GLUCOSE BLDC GLUCOMTR-MCNC: 144 MG/DL — HIGH (ref 70–99)
GLUCOSE BLDC GLUCOMTR-MCNC: 164 MG/DL — HIGH (ref 70–99)
GLUCOSE SERPL-MCNC: 159 MG/DL — HIGH (ref 70–99)
HCT VFR BLD CALC: 27.1 % — LOW (ref 39–50)
HGB BLD-MCNC: 9 G/DL — LOW (ref 13–17)
IANC: 4.03 K/UL — SIGNIFICANT CHANGE UP (ref 1.5–8.5)
IMM GRANULOCYTES NFR BLD AUTO: 0.4 % — SIGNIFICANT CHANGE UP (ref 0–1.5)
INR BLD: 1.21 RATIO — HIGH (ref 0.88–1.16)
LYMPHOCYTES # BLD AUTO: 0.96 K/UL — LOW (ref 1–3.3)
LYMPHOCYTES # BLD AUTO: 17.4 % — SIGNIFICANT CHANGE UP (ref 13–44)
MAGNESIUM SERPL-MCNC: 1.8 MG/DL — SIGNIFICANT CHANGE UP (ref 1.6–2.6)
MCHC RBC-ENTMCNC: 27.4 PG — SIGNIFICANT CHANGE UP (ref 27–34)
MCHC RBC-ENTMCNC: 33.2 GM/DL — SIGNIFICANT CHANGE UP (ref 32–36)
MCV RBC AUTO: 82.6 FL — SIGNIFICANT CHANGE UP (ref 80–100)
MONOCYTES # BLD AUTO: 0.41 K/UL — SIGNIFICANT CHANGE UP (ref 0–0.9)
MONOCYTES NFR BLD AUTO: 7.4 % — SIGNIFICANT CHANGE UP (ref 2–14)
NEUTROPHILS # BLD AUTO: 4.03 K/UL — SIGNIFICANT CHANGE UP (ref 1.8–7.4)
NEUTROPHILS NFR BLD AUTO: 73.2 % — SIGNIFICANT CHANGE UP (ref 43–77)
NRBC # BLD: 0 /100 WBCS — SIGNIFICANT CHANGE UP
NRBC # FLD: 0 K/UL — SIGNIFICANT CHANGE UP
PHOSPHATE SERPL-MCNC: 3.5 MG/DL — SIGNIFICANT CHANGE UP (ref 2.5–4.5)
PLATELET # BLD AUTO: 192 K/UL — SIGNIFICANT CHANGE UP (ref 150–400)
POTASSIUM SERPL-MCNC: 4.4 MMOL/L — SIGNIFICANT CHANGE UP (ref 3.5–5.3)
POTASSIUM SERPL-SCNC: 4.4 MMOL/L — SIGNIFICANT CHANGE UP (ref 3.5–5.3)
PROTHROM AB SERPL-ACNC: 13.8 SEC — HIGH (ref 10.6–13.6)
RBC # BLD: 3.28 M/UL — LOW (ref 4.2–5.8)
RBC # FLD: 16 % — HIGH (ref 10.3–14.5)
RH IG SCN BLD-IMP: POSITIVE — SIGNIFICANT CHANGE UP
SODIUM SERPL-SCNC: 132 MMOL/L — LOW (ref 135–145)
WBC # BLD: 5.51 K/UL — SIGNIFICANT CHANGE UP (ref 3.8–10.5)
WBC # FLD AUTO: 5.51 K/UL — SIGNIFICANT CHANGE UP (ref 3.8–10.5)

## 2021-10-12 PROCEDURE — 49406 IMAGE CATH FLUID PERI/RETRO: CPT

## 2021-10-12 RX ORDER — SODIUM CHLORIDE 9 MG/ML
1000 INJECTION, SOLUTION INTRAVENOUS
Refills: 0 | Status: DISCONTINUED | OUTPATIENT
Start: 2021-10-12 | End: 2021-10-13

## 2021-10-12 RX ORDER — PIPERACILLIN AND TAZOBACTAM 4; .5 G/20ML; G/20ML
3.38 INJECTION, POWDER, LYOPHILIZED, FOR SOLUTION INTRAVENOUS EVERY 12 HOURS
Refills: 0 | Status: DISCONTINUED | OUTPATIENT
Start: 2021-10-12 | End: 2021-10-13

## 2021-10-12 RX ORDER — DEXTROSE 50 % IN WATER 50 %
15 SYRINGE (ML) INTRAVENOUS ONCE
Refills: 0 | Status: DISCONTINUED | OUTPATIENT
Start: 2021-10-12 | End: 2021-10-13

## 2021-10-12 RX ORDER — RAMIPRIL 5 MG
1 CAPSULE ORAL
Qty: 0 | Refills: 0 | DISCHARGE

## 2021-10-12 RX ORDER — GLUCAGON INJECTION, SOLUTION 0.5 MG/.1ML
1 INJECTION, SOLUTION SUBCUTANEOUS ONCE
Refills: 0 | Status: DISCONTINUED | OUTPATIENT
Start: 2021-10-12 | End: 2021-10-13

## 2021-10-12 RX ORDER — LISINOPRIL 2.5 MG/1
10 TABLET ORAL
Refills: 0 | Status: DISCONTINUED | OUTPATIENT
Start: 2021-10-12 | End: 2021-10-13

## 2021-10-12 RX ORDER — INSULIN LISPRO 100/ML
VIAL (ML) SUBCUTANEOUS AT BEDTIME
Refills: 0 | Status: DISCONTINUED | OUTPATIENT
Start: 2021-10-12 | End: 2021-10-13

## 2021-10-12 RX ORDER — SODIUM CHLORIDE 9 MG/ML
1000 INJECTION INTRAMUSCULAR; INTRAVENOUS; SUBCUTANEOUS
Refills: 0 | Status: DISCONTINUED | OUTPATIENT
Start: 2021-10-12 | End: 2021-10-13

## 2021-10-12 RX ORDER — HEPARIN SODIUM 5000 [USP'U]/ML
5000 INJECTION INTRAVENOUS; SUBCUTANEOUS EVERY 8 HOURS
Refills: 0 | Status: DISCONTINUED | OUTPATIENT
Start: 2021-10-12 | End: 2021-10-13

## 2021-10-12 RX ORDER — ALLOPURINOL 300 MG
300 TABLET ORAL DAILY
Refills: 0 | Status: DISCONTINUED | OUTPATIENT
Start: 2021-10-12 | End: 2021-10-13

## 2021-10-12 RX ORDER — PIPERACILLIN AND TAZOBACTAM 4; .5 G/20ML; G/20ML
3.38 INJECTION, POWDER, LYOPHILIZED, FOR SOLUTION INTRAVENOUS EVERY 8 HOURS
Refills: 0 | Status: DISCONTINUED | OUTPATIENT
Start: 2021-10-12 | End: 2021-10-12

## 2021-10-12 RX ORDER — PIPERACILLIN AND TAZOBACTAM 4; .5 G/20ML; G/20ML
3.38 INJECTION, POWDER, LYOPHILIZED, FOR SOLUTION INTRAVENOUS ONCE
Refills: 0 | Status: COMPLETED | OUTPATIENT
Start: 2021-10-12 | End: 2021-10-12

## 2021-10-12 RX ORDER — INSULIN LISPRO 100/ML
VIAL (ML) SUBCUTANEOUS
Refills: 0 | Status: DISCONTINUED | OUTPATIENT
Start: 2021-10-12 | End: 2021-10-13

## 2021-10-12 RX ORDER — DEXTROSE 50 % IN WATER 50 %
25 SYRINGE (ML) INTRAVENOUS ONCE
Refills: 0 | Status: DISCONTINUED | OUTPATIENT
Start: 2021-10-12 | End: 2021-10-13

## 2021-10-12 RX ORDER — PIPERACILLIN AND TAZOBACTAM 4; .5 G/20ML; G/20ML
2.25 INJECTION, POWDER, LYOPHILIZED, FOR SOLUTION INTRAVENOUS EVERY 8 HOURS
Refills: 0 | Status: DISCONTINUED | OUTPATIENT
Start: 2021-10-12 | End: 2021-10-12

## 2021-10-12 RX ORDER — MAGNESIUM SULFATE 500 MG/ML
1 VIAL (ML) INJECTION ONCE
Refills: 0 | Status: COMPLETED | OUTPATIENT
Start: 2021-10-12 | End: 2021-10-13

## 2021-10-12 RX ORDER — DEXTROSE 50 % IN WATER 50 %
12.5 SYRINGE (ML) INTRAVENOUS ONCE
Refills: 0 | Status: DISCONTINUED | OUTPATIENT
Start: 2021-10-12 | End: 2021-10-13

## 2021-10-12 RX ORDER — ATORVASTATIN CALCIUM 80 MG/1
10 TABLET, FILM COATED ORAL AT BEDTIME
Refills: 0 | Status: DISCONTINUED | OUTPATIENT
Start: 2021-10-12 | End: 2021-10-13

## 2021-10-12 RX ADMIN — Medication 300 MILLIGRAM(S): at 22:41

## 2021-10-12 RX ADMIN — HEPARIN SODIUM 5000 UNIT(S): 5000 INJECTION INTRAVENOUS; SUBCUTANEOUS at 22:41

## 2021-10-12 RX ADMIN — PIPERACILLIN AND TAZOBACTAM 200 GRAM(S): 4; .5 INJECTION, POWDER, LYOPHILIZED, FOR SOLUTION INTRAVENOUS at 19:53

## 2021-10-12 RX ADMIN — SODIUM CHLORIDE 75 MILLILITER(S): 9 INJECTION INTRAMUSCULAR; INTRAVENOUS; SUBCUTANEOUS at 18:02

## 2021-10-12 RX ADMIN — ATORVASTATIN CALCIUM 10 MILLIGRAM(S): 80 TABLET, FILM COATED ORAL at 22:41

## 2021-10-12 NOTE — CHART NOTE - NSCHARTNOTEFT_GEN_A_CORE
SURGERY POST-OP NOTE    Subjective:   Patient was seen and examined at bedside.  Pt denies any CP, SOB, n/v/d, or chills. Recovering appropriately at the floor. Pain is well controlled.      Vital Signs:  Vital Signs Last 24 Hrs  T(C): 36.8 (12 Oct 2021 20:12), Max: 36.9 (12 Oct 2021 17:27)  T(F): 98.3 (12 Oct 2021 20:12), Max: 98.5 (12 Oct 2021 17:27)  HR: 73 (12 Oct 2021 20:12) (73 - 73)  BP: 106/51 (12 Oct 2021 20:12) (106/51 - 112/61)  BP(mean): --  RR: 14 (12 Oct 2021 20:12) (14 - 15)  SpO2: 100% (12 Oct 2021 20:12) (100% - 100%)    Physical Exam:  General: NAD  Lungs: Nonlabored breathing  Abdomen: Soft, appropriate incisional tenderness at the catheter site, nondistended.  Extremities: Moves all 4 extremities spontaneously.    Assessment:  89 y/o male who presented for a scheduled IR intervention/drainage of perihepatic collection. Patient underwent cholecystectomy (Dr. Rausch in 2016) and developed abdominal pain in November 2020. CT scan and MRI confirmed pathology (intraperitoneal gallstone). Patient underwent CT guided perihepatic collection drainage on 10/12/21. Patient is recovering appropriately at the floor.      Plan  - Zosyn  - Regular diabetic diet  - Continue home meds  - ISS  - Monitor vital signs  - Monitor urine output  - F/U AM labs SURGERY POST-OP NOTE    Subjective:   Patient was seen and examined at bedside.  Pt denies any CP, SOB, n/v/d, or chills. Recovering appropriately at the floor. Pain is well controlled.      Vital Signs:  Vital Signs Last 24 Hrs  T(C): 36.8 (12 Oct 2021 20:12), Max: 36.9 (12 Oct 2021 17:27)  T(F): 98.3 (12 Oct 2021 20:12), Max: 98.5 (12 Oct 2021 17:27)  HR: 73 (12 Oct 2021 20:12) (73 - 73)  BP: 106/51 (12 Oct 2021 20:12) (106/51 - 112/61)  BP(mean): --  RR: 14 (12 Oct 2021 20:12) (14 - 15)  SpO2: 100% (12 Oct 2021 20:12) (100% - 100%)    Physical Exam:  General: NAD  Lungs: Nonlabored breathing  Abdomen: Soft, appropriate incisional tenderness at the catheter site, nondistended.  Extremities: Moves all 4 extremities spontaneously.    Assessment:  89 y/o male who presented for a scheduled IR intervention/drainage of perihepatic collection. Patient underwent cholecystectomy (Dr. Rausch in 2016) and developed abdominal pain in November 2020. CT scan and MRI confirmed pathology (intraperitoneal gallstone). Patient underwent CT guided perihepatic collection drainage on 10/12/21. Patient is recovering appropriately at the floor.      Plan  - Zosyn ( Spoke with Pharmacy , Cr pérez. Zosyn 3.375 BID)  - Regular diabetic diet  - Continue home meds  - ISS  - Monitor vital signs  - Monitor urine output  - F/U AM labs

## 2021-10-12 NOTE — PROCEDURE NOTE - PROCEDURE FINDINGS AND DETAILS
CT guided drainage of right abdominal fluid collection performed. 8 Fr drain placed. Appx 125cc browen cloudy viscous fluid drained. Sample sent for micro. Drain placed to HARDIK.   Pt tolerated procedure without difficulty. Full report to follow.

## 2021-10-12 NOTE — PRE PROCEDURE NOTE - HISTORY OF PRESENT ILLNESS
Interventional Radiology  Pre-Procedure Note    This is a 88y  Male with right upper abdominal collection presenting for drainage    HPI:  87 Year old male with history of DM 2, HLD, Prostate CA s/p TURP, open cholecystomy (4/2016 by Dr. Rausch), presented with RUQ pain. Patient reported pain had started the night of 11/14. Pt described the pain as a sharp/cramp that worsens with siting up right. Patient otherwise denies fever, chills, nausea, vomiting, obstipation. Patient presented to the ED in Nov. 2020 and pain resolved without intervention. Subsequently obtained CT scan of abdomen. Results: CT scan showed A 5.7 x 1.7 x 5.8 cm peripherally enhancing lesion in the inferior perihepatic space which may represent abscess or metastasis. Antral gastritis with suggestion of a small gastric ulcer. No free air. Diverticulosis. Subsequently obtained GI consultation for EGD, performed on 11/16. EGD was performed. Path negative for intestinal metaplasia. He recently underwent a NM bone scan in June 2021 which demonstrated no scintigraphic evidence of osseous metastases.    MR abd from 07/06/21 demonstrated, "Right lateral abdominal wall and chest wall multilocular cystic lesions as described above. Status post cholecystectomy. Nonspecific small cystic lesion in the anterior wall of the distal stomach. This may be sequela of prior instrumentation as well. Multiple tiny to small pancreatic cysts compatible with intraductal papillary mucinous neoplasms. Given patient's age, the usefulness of further follow-up should based on patient's preference and whether patient is a surgical candidate or not.       PAST MEDICAL & SURGICAL HISTORY:  Prostate cancer  being monitored    Hyperlipidemia    Hypertension    ED (erectile dysfunction)    Diabetes mellitus  type II, diagnosed in approximately 2011    Gout    Decreased hearing of right ear    Inguinal hernia  right side in 2021    Perihepatic fluid collection  2021    S/P cataract surgery  left eye    H/O cystoscopy    BPH (benign prostatic hypertrophy)  S/P Greenlight laser of the prostate    S/P TURP    S/P cholecystectomy  Open 2016        Social History:     FAMILY HISTORY:  No pertinent family history in first degree relatives        Allergies: No Known Allergies      Current Medications: piperacillin/tazobactam IVPB. 3.375 Gram(s) IV Intermittent once  piperacillin/tazobactam IVPB.. 2.25 Gram(s) IV Intermittent every 8 hours      Labs:   Complete Blood Count (10.01.21 @ 17:07)    Nucleated RBC: 0 /100 WBCs    WBC Count: 8.15 K/uL    RBC Count: 3.63 M/uL    Hemoglobin: 10.2 g/dL    Hematocrit: 30.2 %    Mean Cell Volume: 83.2 fL    Mean Cell Hemoglobin: 28.1 pg    Mean Cell Hemoglobin Conc: 33.8 gm/dL    Red Cell Distrib Width: 16.0 %    Platelet Count - Automated: 257 K/uL    Nucleated RBC #: 0.00 K/uL  Basic Metabolic Panel (10.01.21 @ 17:07)    Sodium, Serum: 132 mmol/L    Potassium, Serum: 5.2 mmol/L    Chloride, Serum: 95 mmol/L    Carbon Dioxide, Serum: 25 mmol/L    Anion Gap, Serum: 12 mmol/L    Blood Urea Nitrogen, Serum: 63 mg/dL    Creatinine, Serum: 2.20 mg/dL    Glucose, Serum: 137 mg/dL    Calcium, Total Serum: 9.5 mg/dL    eGFR if Non : 26: The units for eGFR are ml/min/1.73m2 (normalized body surface area). The  eGFR is calculated from a serum creatinine using the CKD-EPI equation.  Other variables required for calculation are race, age and sex. Among  patients with chronic kidney disease (CKD), the eGFR is useful in  determining the stage of disease according to KDOQI CKD classification.  All eGFR results are reported numerically with the following  interpretation.      GFR  (ml/min/1.73 m2)          W/KIDNEY DAMAGE    W/O KIDNEY DMG  ==========================================================  >= 90.......................Stage 1..............Normal  60-89.......................Stage 2...........Decreased GFR  30-59.......................Stage 3..............Stage 3  15-29.......................Stage 4..............Stage 4  < 15........................Stage 5..............Stage 5  Each stage of CKD assumes that the associated GFR level has been in  effect for at least 3 months. Determination of stages one and two (with  eGFR > 59ml/min/m2) requires estimation of kidney damage for at least 3  months as defined by structural or functional abnormalities.  Limitations: All estimates of GFR will be less accurate for patients at  extremes of muscle mass (including but not limited to frail elderly,  critically ill, or cancer patients), those with unusual diets, and those  with conditions associated with reduced secretion or extrarenal  elimination of creatinine. The eGFR equation is not recommended for use  in patients with unstable creatinine levels. mL/min/1.73M2    eGFR if African American: 30 mL/min/1.73M2        Radiology:  < from: CT Abdomen and Pelvis w/ IV Cont (08.28.21 @ 13:52) >  EXAM:  CT ABDOMEN AND PELVIS IC        PROCEDURE DATE:  08/28/2021           INTERPRETATION:  CLINICAL INFORMATION: pancreatic cyst; No content available for this procedure Ordering Dxs: K86.2 Cyst of pancreas / K65.0 Generalized (acute) peritonitis /// Admitting Dxs: K86.2 CYST OF PANCREAS / K65.0 GENERALIZED (ACUTE) PERITONITIS pancreas protocol    COMPARISON: 7.6.21.    LIVER: Multiloculated perihepatic collection involving the abdominal wall musculature is essentially unchanged. A reference pocket measures 5 x 4 cm on series 3 image 53. Adjacent peritoneal nodules are again noted.      IMPRESSION: Indeterminate complex multilocular right perihepatic fluid collection is essentially unchanged. Adjacent small peritoneal nodules are unchanged.        --- End of Report ---              BRENNAN LUNDBERG MD; Attending Radiologist   This document has been electronically signed. Aug 28 2021  5:04PM    < end of copied text >        Assessment/Plan:   This is a 88y Male  presents with right abdominal collection  Patient presents to IR for drainage with CT guidance. Case was discussed with Dr. Latham. Plan is for patient to undergo drainage and be admitted for observation. Pt to get IV abx and fluid sent for culture.  Procedure/ risks/ benefits/ goals/ alternatives were explained. All questions answered. Informed content obtained from patient. Consent placed in chart.

## 2021-10-13 ENCOUNTER — TRANSCRIPTION ENCOUNTER (OUTPATIENT)
Age: 86
End: 2021-10-13

## 2021-10-13 VITALS — TEMPERATURE: 98 F | SYSTOLIC BLOOD PRESSURE: 116 MMHG | HEART RATE: 68 BPM | DIASTOLIC BLOOD PRESSURE: 57 MMHG

## 2021-10-13 PROBLEM — K40.90 UNILATERAL INGUINAL HERNIA, WITHOUT OBSTRUCTION OR GANGRENE, NOT SPECIFIED AS RECURRENT: Chronic | Status: ACTIVE | Noted: 2021-10-01

## 2021-10-13 PROBLEM — K76.89 OTHER SPECIFIED DISEASES OF LIVER: Chronic | Status: ACTIVE | Noted: 2021-10-01

## 2021-10-13 PROBLEM — H91.91 UNSPECIFIED HEARING LOSS, RIGHT EAR: Chronic | Status: ACTIVE | Noted: 2021-10-01

## 2021-10-13 LAB
ALBUMIN SERPL ELPH-MCNC: 3.7 G/DL — SIGNIFICANT CHANGE UP (ref 3.3–5)
ALP SERPL-CCNC: 59 U/L — SIGNIFICANT CHANGE UP (ref 40–120)
ALT FLD-CCNC: 8 U/L — SIGNIFICANT CHANGE UP (ref 4–41)
ANION GAP SERPL CALC-SCNC: 11 MMOL/L — SIGNIFICANT CHANGE UP (ref 7–14)
AST SERPL-CCNC: 11 U/L — SIGNIFICANT CHANGE UP (ref 4–40)
BILIRUB SERPL-MCNC: 0.3 MG/DL — SIGNIFICANT CHANGE UP (ref 0.2–1.2)
BUN SERPL-MCNC: 49 MG/DL — HIGH (ref 7–23)
CALCIUM SERPL-MCNC: 8.8 MG/DL — SIGNIFICANT CHANGE UP (ref 8.4–10.5)
CHLORIDE SERPL-SCNC: 103 MMOL/L — SIGNIFICANT CHANGE UP (ref 98–107)
CHLORIDE UR-SCNC: 50 MMOL/L — SIGNIFICANT CHANGE UP
CO2 SERPL-SCNC: 21 MMOL/L — LOW (ref 22–31)
COVID-19 SPIKE DOMAIN AB INTERP: POSITIVE
COVID-19 SPIKE DOMAIN ANTIBODY RESULT: >250 U/ML — HIGH
CREAT ?TM UR-MCNC: 47 MG/DL — SIGNIFICANT CHANGE UP
CREAT SERPL-MCNC: 2.03 MG/DL — HIGH (ref 0.5–1.3)
GLUCOSE BLDC GLUCOMTR-MCNC: 143 MG/DL — HIGH (ref 70–99)
GLUCOSE BLDC GLUCOMTR-MCNC: 89 MG/DL — SIGNIFICANT CHANGE UP (ref 70–99)
GLUCOSE SERPL-MCNC: 75 MG/DL — SIGNIFICANT CHANGE UP (ref 70–99)
GRAM STN FLD: SIGNIFICANT CHANGE UP
HCT VFR BLD CALC: 27 % — LOW (ref 39–50)
HGB BLD-MCNC: 8.9 G/DL — LOW (ref 13–17)
MAGNESIUM SERPL-MCNC: 2 MG/DL — SIGNIFICANT CHANGE UP (ref 1.6–2.6)
MCHC RBC-ENTMCNC: 27.6 PG — SIGNIFICANT CHANGE UP (ref 27–34)
MCHC RBC-ENTMCNC: 33 GM/DL — SIGNIFICANT CHANGE UP (ref 32–36)
MCV RBC AUTO: 83.6 FL — SIGNIFICANT CHANGE UP (ref 80–100)
NRBC # BLD: 0 /100 WBCS — SIGNIFICANT CHANGE UP
NRBC # FLD: 0 K/UL — SIGNIFICANT CHANGE UP
OSMOLALITY UR: 337 MOSM/KG — SIGNIFICANT CHANGE UP (ref 50–1200)
PHOSPHATE SERPL-MCNC: 3.8 MG/DL — SIGNIFICANT CHANGE UP (ref 2.5–4.5)
PLATELET # BLD AUTO: 201 K/UL — SIGNIFICANT CHANGE UP (ref 150–400)
POTASSIUM SERPL-MCNC: 4.3 MMOL/L — SIGNIFICANT CHANGE UP (ref 3.5–5.3)
POTASSIUM SERPL-SCNC: 4.3 MMOL/L — SIGNIFICANT CHANGE UP (ref 3.5–5.3)
POTASSIUM UR-SCNC: 20.6 MMOL/L — SIGNIFICANT CHANGE UP
PROT SERPL-MCNC: 7.1 G/DL — SIGNIFICANT CHANGE UP (ref 6–8.3)
RBC # BLD: 3.23 M/UL — LOW (ref 4.2–5.8)
RBC # FLD: 16.1 % — HIGH (ref 10.3–14.5)
SARS-COV-2 IGG+IGM SERPL QL IA: >250 U/ML — HIGH
SARS-COV-2 IGG+IGM SERPL QL IA: POSITIVE
SODIUM SERPL-SCNC: 135 MMOL/L — SIGNIFICANT CHANGE UP (ref 135–145)
SODIUM UR-SCNC: 63 MMOL/L — SIGNIFICANT CHANGE UP
SPECIMEN SOURCE: SIGNIFICANT CHANGE UP
WBC # BLD: 5.47 K/UL — SIGNIFICANT CHANGE UP (ref 3.8–10.5)
WBC # FLD AUTO: 5.47 K/UL — SIGNIFICANT CHANGE UP (ref 3.8–10.5)

## 2021-10-13 PROCEDURE — 99231 SBSQ HOSP IP/OBS SF/LOW 25: CPT

## 2021-10-13 RX ORDER — SODIUM CHLORIDE 9 MG/ML
500 INJECTION, SOLUTION INTRAVENOUS ONCE
Refills: 0 | Status: COMPLETED | OUTPATIENT
Start: 2021-10-13 | End: 2021-10-13

## 2021-10-13 RX ORDER — SENNA PLUS 8.6 MG/1
2 TABLET ORAL
Qty: 20 | Refills: 0
Start: 2021-10-13 | End: 2021-10-22

## 2021-10-13 RX ORDER — SODIUM CHLORIDE 9 MG/ML
3 INJECTION INTRAMUSCULAR; INTRAVENOUS; SUBCUTANEOUS EVERY 8 HOURS
Refills: 0 | Status: DISCONTINUED | OUTPATIENT
Start: 2021-10-13 | End: 2021-10-13

## 2021-10-13 RX ADMIN — PIPERACILLIN AND TAZOBACTAM 25 GRAM(S): 4; .5 INJECTION, POWDER, LYOPHILIZED, FOR SOLUTION INTRAVENOUS at 09:53

## 2021-10-13 RX ADMIN — HEPARIN SODIUM 5000 UNIT(S): 5000 INJECTION INTRAVENOUS; SUBCUTANEOUS at 13:26

## 2021-10-13 RX ADMIN — Medication 300 MILLIGRAM(S): at 12:55

## 2021-10-13 RX ADMIN — HEPARIN SODIUM 5000 UNIT(S): 5000 INJECTION INTRAVENOUS; SUBCUTANEOUS at 05:23

## 2021-10-13 RX ADMIN — SODIUM CHLORIDE 500 MILLILITER(S): 9 INJECTION, SOLUTION INTRAVENOUS at 05:22

## 2021-10-13 RX ADMIN — Medication 100 GRAM(S): at 01:28

## 2021-10-13 NOTE — PROVIDER CONTACT NOTE (OTHER) - ASSESSMENT
Pt BP 85/50, HR 62, temp 98.3, SPO2 98, RR 14. Pt not c/o nausea or headache. Pt states "I feel fine"

## 2021-10-13 NOTE — PROGRESS NOTE ADULT - ASSESSMENT
88 year old male with abdominal collection s/p drain placement in IR on 10/12    Plan:  continue bedside drainage   monitor output  f/u Cx  follow up as outpatient in 1 week for CT Abdomen/tube check

## 2021-10-13 NOTE — DISCHARGE NOTE PROVIDER - NSDCFUADDINST_GEN_ALL_CORE_FT
WOUND CARE:  Please keep incisions clean and dry. Please do not Scrub or rub incisions. Do not use lotion or powder on incisions.   BATHING: You may shower and/or sponge bathe. You may use warm soapy water in the shower and rinse, pat dry.  ACTIVITY: No heavy lifting or straining. Otherwise, you may return to your usual level of physical activity. If you are taking narcotic pain medication DO NOT drive a car, operate machinery or make important decisions.  DIET: Return to your usual diet.  NOTIFY YOUR SURGEON IF YOU HAVE: any bleeding that does not stop, any pus draining from your wound(s), any fever (over 100.4 F) persistent nausea/vomiting, or if your pain is not controlled on your discharge pain medications, unable to urinate.  Please follow up with your primary care physician in one week regarding your hospitalization, bring copies of your discharge paperwork.  Please follow up with your surgeon, Dr. Latham as an outpatient, please call to schedule appointment  WOUND CARE:  Please keep incisions clean and dry. Please do not Scrub or rub incisions. Do not use lotion or powder on incisions.   BATHING: You may shower and/or sponge bathe. You may use warm soapy water in the shower and rinse, pat dry.  ACTIVITY: No heavy lifting or straining. Otherwise, you may return to your usual level of physical activity. If you are taking narcotic pain medication DO NOT drive a car, operate machinery or make important decisions.  DIET: Return to your usual diet.  NOTIFY YOUR SURGEON IF YOU HAVE: any bleeding that does not stop, any pus draining from your wound(s), any fever (over 100.4 F) persistent nausea/vomiting, or if your pain is not controlled on your discharge pain medications, unable to urinate.  Please follow up with your primary care physician in one week regarding your hospitalization, bring copies of your discharge paperwork.  Please follow up with your surgeon, Dr. Latham as an outpatient, please call to schedule appointment in 1-2 weeks. Please call to see Dr. Smith as an outpatient in 2 weeks.

## 2021-10-13 NOTE — DISCHARGE NOTE PROVIDER - NSDCCPTREATMENT_GEN_ALL_CORE_FT
PRINCIPAL PROCEDURE  Procedure: Drainage of perihepatic fluid with ultrasound guidance  Findings and Treatment:

## 2021-10-13 NOTE — DISCHARGE NOTE PROVIDER - HOSPITAL COURSE
This is an 87 y/o male who presents with perihepatic collection. Patient developed abdominal pain in end of last year. Eventual CT scan and MRI confirmed pathology. Has h/o cholecystectomy in 2016. Scheduled for CT guided perihepatic collection drainage    Patient was admitted on 10/12/210 to undergo IR drainage of Perihepatic collection. He tolerated the procedure well and was transferred to the surgical floor in stable condition. He was started on IV antibiotics, which will be transitioned to oral upon discharge. Once he was tolerating regular diet, voiding and ambulating without difficulty, he was found to be stable to discharge to home.

## 2021-10-13 NOTE — DISCHARGE NOTE PROVIDER - NSDCMRMEDTOKEN_GEN_ALL_CORE_FT
allopurinol 300 mg oral tablet: 1 tab(s) orally once a day  Augmentin 875 mg-125 mg oral tablet: 875 milligram(s) orally every 12 hours   B complex  one tablet daily orally:   glyBURIDE micronized 3 mg oral tablet: one tablet in the am and one tablet in the pm  glyBURIDE micronized 3 mg oral tablet: takes 1/2 tablet in the afternoon  Januvia 50 mg oral tablet: 1 tab(s) orally once a day at dinner time  lovastatin 20 mg oral tablet: 1 tab(s) orally once a day  ramipril 2.5 mg oral capsule: 1 cap(s) orally every other day  zinc acetate 50 mg oral capsule: 1 cap(s) orally once a day   allopurinol 300 mg oral tablet: 1 tab(s) orally once a day  Augmentin 875 mg-125 mg oral tablet: 875 milligram(s) orally every 12 hours   B complex  one tablet daily orally:   glyBURIDE micronized 3 mg oral tablet: one tablet in the am and one tablet in the pm  glyBURIDE micronized 3 mg oral tablet: takes 1/2 tablet in the afternoon  Januvia 50 mg oral tablet: 1 tab(s) orally once a day at dinner time  lovastatin 20 mg oral tablet: 1 tab(s) orally once a day  ramipril 2.5 mg oral capsule: 1 cap(s) orally every other day  senna oral tablet: 2 tab(s) orally once a day   zinc acetate 50 mg oral capsule: 1 cap(s) orally once a day

## 2021-10-13 NOTE — PROGRESS NOTE ADULT - ASSESSMENT
Assessment:          Plan: Assessment:  87 y/o male who presented for a scheduled IR intervention/drainage of perihepatic collection. Patient underwent cholecystectomy (Dr. Rausch in 2016) and developed abdominal pain in November 2020. CT scan and MRI confirmed pathology (intraperitoneal gallstone). Patient underwent CT guided perihepatic collection drainage on 10/12/21. Patient is recovering appropriately at the floor.      Plan  - F/u vitals, if stable pt have diet advanced  - Zosyn; Pt to be d/c'd on augmentin  - Continue home meds  - ISS  - Monitor vital signs  - Monitor urine output  - F/U AM labs.- Dispo: Home    D-Team Surgery  [id number]

## 2021-10-13 NOTE — DISCHARGE NOTE PROVIDER - NSDCCPCAREPLAN_GEN_ALL_CORE_FT
PRINCIPAL DISCHARGE DIAGNOSIS  Diagnosis: Perihepatic fluid collection  Assessment and Plan of Treatment: s/p IR drainage      SECONDARY DISCHARGE DIAGNOSES  Diagnosis: HTN (hypertension)  Assessment and Plan of Treatment:     Diagnosis: HLD (hyperlipidemia)  Assessment and Plan of Treatment:     Diagnosis: DM (diabetes mellitus)  Assessment and Plan of Treatment:     Diagnosis: Prostate CA  Assessment and Plan of Treatment:

## 2021-10-13 NOTE — PROGRESS NOTE ADULT - SUBJECTIVE AND OBJECTIVE BOX
SURGERY PROGRESS NOTE    Subjective:     Vital Signs:  Vital Signs Last 24 Hrs  T(C): 36.6 (13 Oct 2021 00:13), Max: 36.9 (12 Oct 2021 17:27)  T(F): 97.9 (13 Oct 2021 00:13), Max: 98.5 (12 Oct 2021 17:27)  HR: 73 (13 Oct 2021 00:13) (73 - 73)  BP: 91/48 (13 Oct 2021 00:13) (91/48 - 112/61)  BP(mean): --  RR: 15 (13 Oct 2021 00:13) (14 - 15)  SpO2: 98% (13 Oct 2021 00:13) (98% - 100%)    Physical Exam:  General:   Lungs:   CV:   Abdomen:  Extremities:          Morning Surgical Progress Note    SUBJECTIVE: Patient seen and examined at bedside with surgical team, patient without complaints. Pt denied chest pain, SOB, fevers/chills, N/V/D/C.    Vital Signs Last 24 Hrs  T(C): 36.4 (13 Oct 2021 06:32), Max: 36.9 (12 Oct 2021 17:27)  T(F): 97.6 (13 Oct 2021 06:32), Max: 98.5 (12 Oct 2021 17:27)  HR: 57 (13 Oct 2021 06:32) (57 - 73)  BP: 100/60 (13 Oct 2021 06:32) (85/50 - 112/61)  BP(mean): --  RR: 15 (13 Oct 2021 06:32) (14 - 15)  SpO2: 100% (13 Oct 2021 06:32) (98% - 100%)I&O's Detail    12 Oct 2021 07:01  -  13 Oct 2021 07:00  --------------------------------------------------------  IN:    Lactated Ringers Bolus: 500 mL    sodium chloride 0.9%: 825 mL  Total IN: 1325 mL    OUT:    Drain (mL): 25 mL    Voided (mL): 1550 mL  Total OUT: 1575 mL    Total NET: -250 mL        Medications  MEDICATIONS  (STANDING):  allopurinol 300 milliGRAM(s) Oral daily  atorvastatin 10 milliGRAM(s) Oral at bedtime  dextrose 40% Gel 15 Gram(s) Oral once  dextrose 5%. 1000 milliLiter(s) (50 mL/Hr) IV Continuous <Continuous>  dextrose 5%. 1000 milliLiter(s) (100 mL/Hr) IV Continuous <Continuous>  dextrose 50% Injectable 25 Gram(s) IV Push once  dextrose 50% Injectable 12.5 Gram(s) IV Push once  dextrose 50% Injectable 25 Gram(s) IV Push once  glucagon  Injectable 1 milliGRAM(s) IntraMuscular once  heparin   Injectable 5000 Unit(s) SubCutaneous every 8 hours  insulin lispro (ADMELOG) corrective regimen sliding scale   SubCutaneous three times a day before meals  insulin lispro (ADMELOG) corrective regimen sliding scale   SubCutaneous at bedtime  lisinopril 10 milliGRAM(s) Oral <User Schedule>  piperacillin/tazobactam IVPB.. 3.375 Gram(s) IV Intermittent every 12 hours  sodium chloride 0.9%. 1000 milliLiter(s) (75 mL/Hr) IV Continuous <Continuous>    MEDICATIONS  (PRN):      Physical Exam  Physical Exam:  General: NAD  Lungs: Nonlabored breathing  Abdomen: Soft, appropriate incisional tenderness at the catheter site, nondistended.  Extremities: Moves all 4 extremities spontaneously.    LABS:                        8.9    5.47  )-----------( 201      ( 13 Oct 2021 06:57 )             27.0     10-13    135  |  103  |  49<H>  ----------------------------<  75  4.3   |  21<L>  |  2.03<H>    Ca    8.8      13 Oct 2021 06:57  Phos  3.8     10-13  Mg     2.00     10-13    TPro  7.1  /  Alb  3.7  /  TBili  0.3  /  DBili  x   /  AST  11  /  ALT  8   /  AlkPhos  59  10-13    PT/INR - ( 12 Oct 2021 21:32 )   PT: 13.8 sec;   INR: 1.21 ratio         PTT - ( 12 Oct 2021 21:32 )  PTT:30.0 sec  LIVER FUNCTIONS - ( 13 Oct 2021 06:57 )  Alb: 3.7 g/dL / Pro: 7.1 g/dL / ALK PHOS: 59 U/L / ALT: 8 U/L / AST: 11 U/L / GGT: x             ABO Interpretation: O (10-12-21 @ 21:34)

## 2021-10-13 NOTE — DISCHARGE NOTE PROVIDER - CARE PROVIDER_API CALL
Steffen Latham)  Surgery  [address] [place] [address]  Phone: [phone]  Fax: [phone]  Follow Up Time:    Steffen Latham)  Surgery  [address] [place] [address]  Phone: [phone]  Fax: [phone]  Follow Up Time:     Julito Smith)  Interventional Radiology and Diagnostic Radiology  [address] [place] [address]  Phone: [phone]  Fax: [phone]  Follow Up Time:

## 2021-10-13 NOTE — DISCHARGE NOTE PROVIDER - CARE PROVIDERS DIRECT ADDRESSES
[email or web address]

## 2021-10-13 NOTE — DISCHARGE NOTE NURSING/CASE MANAGEMENT/SOCIAL WORK - PATIENT PORTAL LINK FT
You can access the FollowMyHealth Patient Portal offered by [facility] by registering at the following website: [email or web address]. By joining [email or web address] FollowMyHealth portal, you will also be able to view your health information using other applications (apps) compatible with our system.

## 2021-10-13 NOTE — DISCHARGE NOTE PROVIDER - PROVIDER TOKENS
PROVIDER:[TOKEN:[[id number]]] PROVIDER:[TOKEN:[[id number]]],PROVIDER:[TOKEN:[[id number]]]

## 2021-10-15 LAB
-  AMIKACIN: SIGNIFICANT CHANGE UP
-  AMOXICILLIN/CLAVULANIC ACID: SIGNIFICANT CHANGE UP
-  AMPICILLIN/SULBACTAM: SIGNIFICANT CHANGE UP
-  AMPICILLIN: SIGNIFICANT CHANGE UP
-  AZTREONAM: SIGNIFICANT CHANGE UP
-  CEFAZOLIN: SIGNIFICANT CHANGE UP
-  CEFEPIME: SIGNIFICANT CHANGE UP
-  CEFOXITIN: SIGNIFICANT CHANGE UP
-  CEFTRIAXONE: SIGNIFICANT CHANGE UP
-  CIPROFLOXACIN: SIGNIFICANT CHANGE UP
-  ERTAPENEM: SIGNIFICANT CHANGE UP
-  GENTAMICIN: SIGNIFICANT CHANGE UP
-  IMIPENEM: SIGNIFICANT CHANGE UP
-  LEVOFLOXACIN: SIGNIFICANT CHANGE UP
-  MEROPENEM: SIGNIFICANT CHANGE UP
-  PIPERACILLIN/TAZOBACTAM: SIGNIFICANT CHANGE UP
-  TOBRAMYCIN: SIGNIFICANT CHANGE UP
-  TRIMETHOPRIM/SULFAMETHOXAZOLE: SIGNIFICANT CHANGE UP
METHOD TYPE: SIGNIFICANT CHANGE UP

## 2021-10-17 LAB
CULTURE RESULTS: SIGNIFICANT CHANGE UP
ORGANISM # SPEC MICROSCOPIC CNT: SIGNIFICANT CHANGE UP
ORGANISM # SPEC MICROSCOPIC CNT: SIGNIFICANT CHANGE UP
SPECIMEN SOURCE: SIGNIFICANT CHANGE UP

## 2021-10-20 ENCOUNTER — RESULT REVIEW (OUTPATIENT)
Age: 86
End: 2021-10-20

## 2021-10-20 ENCOUNTER — APPOINTMENT (OUTPATIENT)
Dept: CT IMAGING | Facility: HOSPITAL | Age: 86
End: 2021-10-20

## 2021-10-20 ENCOUNTER — OUTPATIENT (OUTPATIENT)
Dept: OUTPATIENT SERVICES | Facility: HOSPITAL | Age: 86
LOS: 1 days | End: 2021-10-20
Payer: MEDICARE

## 2021-10-20 VITALS — SYSTOLIC BLOOD PRESSURE: 121 MMHG | DIASTOLIC BLOOD PRESSURE: 84 MMHG | HEART RATE: 78 BPM | TEMPERATURE: 98 F

## 2021-10-20 VITALS — HEART RATE: 88 BPM | TEMPERATURE: 98 F | SYSTOLIC BLOOD PRESSURE: 130 MMHG | DIASTOLIC BLOOD PRESSURE: 68 MMHG

## 2021-10-20 DIAGNOSIS — Z98.89 OTHER SPECIFIED POSTPROCEDURAL STATES: Chronic | ICD-10-CM

## 2021-10-20 DIAGNOSIS — K65.1 PERITONEAL ABSCESS: ICD-10-CM

## 2021-10-20 DIAGNOSIS — N40.0 BENIGN PROSTATIC HYPERPLASIA WITHOUT LOWER URINARY TRACT SYMPTOMS: Chronic | ICD-10-CM

## 2021-10-20 DIAGNOSIS — Z90.49 ACQUIRED ABSENCE OF OTHER SPECIFIED PARTS OF DIGESTIVE TRACT: Chronic | ICD-10-CM

## 2021-10-20 DIAGNOSIS — Z90.79 ACQUIRED ABSENCE OF OTHER GENITAL ORGAN(S): Chronic | ICD-10-CM

## 2021-10-20 DIAGNOSIS — Z98.49 CATARACT EXTRACTION STATUS, UNSPECIFIED EYE: Chronic | ICD-10-CM

## 2021-10-20 PROCEDURE — 49424 ASSESS CYST CONTRAST INJECT: CPT

## 2021-10-20 PROCEDURE — 74150 CT ABDOMEN W/O CONTRAST: CPT | Mod: 26

## 2021-10-20 PROCEDURE — 76080 X-RAY EXAM OF FISTULA: CPT | Mod: 26

## 2021-10-27 ENCOUNTER — RESULT REVIEW (OUTPATIENT)
Age: 86
End: 2021-10-27

## 2021-10-27 ENCOUNTER — OUTPATIENT (OUTPATIENT)
Dept: OUTPATIENT SERVICES | Facility: HOSPITAL | Age: 86
LOS: 1 days | End: 2021-10-27
Payer: MEDICARE

## 2021-10-27 ENCOUNTER — APPOINTMENT (OUTPATIENT)
Dept: CT IMAGING | Facility: HOSPITAL | Age: 86
End: 2021-10-27

## 2021-10-27 VITALS
HEART RATE: 81 BPM | RESPIRATION RATE: 14 BRPM | DIASTOLIC BLOOD PRESSURE: 82 MMHG | SYSTOLIC BLOOD PRESSURE: 133 MMHG | TEMPERATURE: 98 F

## 2021-10-27 VITALS
RESPIRATION RATE: 14 BRPM | TEMPERATURE: 97 F | HEART RATE: 79 BPM | SYSTOLIC BLOOD PRESSURE: 142 MMHG | DIASTOLIC BLOOD PRESSURE: 69 MMHG

## 2021-10-27 DIAGNOSIS — Z90.49 ACQUIRED ABSENCE OF OTHER SPECIFIED PARTS OF DIGESTIVE TRACT: Chronic | ICD-10-CM

## 2021-10-27 DIAGNOSIS — Z98.49 CATARACT EXTRACTION STATUS, UNSPECIFIED EYE: Chronic | ICD-10-CM

## 2021-10-27 DIAGNOSIS — K65.1 PERITONEAL ABSCESS: ICD-10-CM

## 2021-10-27 DIAGNOSIS — Z98.89 OTHER SPECIFIED POSTPROCEDURAL STATES: Chronic | ICD-10-CM

## 2021-10-27 DIAGNOSIS — N40.0 BENIGN PROSTATIC HYPERPLASIA WITHOUT LOWER URINARY TRACT SYMPTOMS: Chronic | ICD-10-CM

## 2021-10-27 DIAGNOSIS — Z90.79 ACQUIRED ABSENCE OF OTHER GENITAL ORGAN(S): Chronic | ICD-10-CM

## 2021-10-27 PROCEDURE — 76080 X-RAY EXAM OF FISTULA: CPT | Mod: 26

## 2021-10-27 PROCEDURE — 49424 ASSESS CYST CONTRAST INJECT: CPT

## 2021-10-27 PROCEDURE — 74150 CT ABDOMEN W/O CONTRAST: CPT | Mod: 26

## 2021-10-28 DIAGNOSIS — Z46.82 ENCOUNTER FOR FITTING AND ADJUSTMENT OF NON-VASCULAR CATHETER: ICD-10-CM

## 2021-10-28 DIAGNOSIS — K65.1 PERITONEAL ABSCESS: ICD-10-CM

## 2021-11-03 ENCOUNTER — RESULT REVIEW (OUTPATIENT)
Age: 86
End: 2021-11-03

## 2021-11-03 ENCOUNTER — OUTPATIENT (OUTPATIENT)
Dept: OUTPATIENT SERVICES | Facility: HOSPITAL | Age: 86
LOS: 1 days | End: 2021-11-03
Payer: MEDICARE

## 2021-11-03 VITALS
HEART RATE: 88 BPM | SYSTOLIC BLOOD PRESSURE: 120 MMHG | TEMPERATURE: 98 F | DIASTOLIC BLOOD PRESSURE: 66 MMHG | RESPIRATION RATE: 16 BRPM | OXYGEN SATURATION: 99 %

## 2021-11-03 VITALS
SYSTOLIC BLOOD PRESSURE: 138 MMHG | DIASTOLIC BLOOD PRESSURE: 71 MMHG | HEART RATE: 83 BPM | TEMPERATURE: 98 F | OXYGEN SATURATION: 100 % | RESPIRATION RATE: 16 BRPM

## 2021-11-03 DIAGNOSIS — Z46.82 ENCOUNTER FOR FITTING AND ADJUSTMENT OF NON-VASCULAR CATHETER: ICD-10-CM

## 2021-11-03 DIAGNOSIS — Z98.49 CATARACT EXTRACTION STATUS, UNSPECIFIED EYE: Chronic | ICD-10-CM

## 2021-11-03 DIAGNOSIS — Z98.89 OTHER SPECIFIED POSTPROCEDURAL STATES: Chronic | ICD-10-CM

## 2021-11-03 DIAGNOSIS — K65.1 PERITONEAL ABSCESS: ICD-10-CM

## 2021-11-03 DIAGNOSIS — Z90.49 ACQUIRED ABSENCE OF OTHER SPECIFIED PARTS OF DIGESTIVE TRACT: Chronic | ICD-10-CM

## 2021-11-03 DIAGNOSIS — Z90.79 ACQUIRED ABSENCE OF OTHER GENITAL ORGAN(S): Chronic | ICD-10-CM

## 2021-11-03 DIAGNOSIS — N40.0 BENIGN PROSTATIC HYPERPLASIA WITHOUT LOWER URINARY TRACT SYMPTOMS: Chronic | ICD-10-CM

## 2021-11-03 PROCEDURE — 49424 ASSESS CYST CONTRAST INJECT: CPT

## 2021-11-03 PROCEDURE — 76080 X-RAY EXAM OF FISTULA: CPT | Mod: 26

## 2021-11-08 ENCOUNTER — NON-APPOINTMENT (OUTPATIENT)
Age: 86
End: 2021-11-08

## 2021-11-10 DIAGNOSIS — Z46.82 ENCOUNTER FOR FITTING AND ADJUSTMENT OF NON-VASCULAR CATHETER: ICD-10-CM

## 2021-11-10 LAB
CULTURE RESULTS: SIGNIFICANT CHANGE UP
SPECIMEN SOURCE: SIGNIFICANT CHANGE UP

## 2023-09-21 ENCOUNTER — APPOINTMENT (OUTPATIENT)
Dept: OPHTHALMOLOGY | Facility: CLINIC | Age: 88
End: 2023-09-21
Payer: MEDICARE

## 2023-09-21 ENCOUNTER — APPOINTMENT (OUTPATIENT)
Dept: OPHTHALMOLOGY | Facility: CLINIC | Age: 88
End: 2023-09-21

## 2023-09-21 ENCOUNTER — NON-APPOINTMENT (OUTPATIENT)
Age: 88
End: 2023-09-21

## 2023-09-21 PROCEDURE — 92202 OPSCPY EXTND ON/MAC DRAW: CPT

## 2023-09-21 PROCEDURE — 92004 COMPRE OPH EXAM NEW PT 1/>: CPT

## 2023-09-21 PROCEDURE — 92286 ANT SGM IMG I&R SPECLR MIC: CPT

## 2023-10-19 ENCOUNTER — NON-APPOINTMENT (OUTPATIENT)
Age: 88
End: 2023-10-19

## 2023-10-19 ENCOUNTER — APPOINTMENT (OUTPATIENT)
Dept: OPHTHALMOLOGY | Facility: CLINIC | Age: 88
End: 2023-10-19
Payer: MEDICARE

## 2023-10-19 PROCEDURE — 92025 CPTRIZED CORNEAL TOPOGRAPHY: CPT

## 2023-10-19 PROCEDURE — 66821 AFTER CATARACT LASER SURGERY: CPT | Mod: LT

## 2023-10-19 PROCEDURE — 92134 CPTRZ OPH DX IMG PST SGM RTA: CPT

## 2024-01-25 ENCOUNTER — APPOINTMENT (OUTPATIENT)
Dept: OPHTHALMOLOGY | Facility: CLINIC | Age: 89
End: 2024-01-25

## 2024-06-18 ENCOUNTER — INPATIENT (INPATIENT)
Facility: HOSPITAL | Age: 89
LOS: 0 days | Discharge: HOME | End: 2024-06-19
Attending: HOSPITALIST | Admitting: HOSPITALIST
Payer: MEDICARE

## 2024-06-18 VITALS
WEIGHT: 162.92 LBS | DIASTOLIC BLOOD PRESSURE: 80 MMHG | TEMPERATURE: 98 F | OXYGEN SATURATION: 97 % | HEART RATE: 103 BPM | RESPIRATION RATE: 18 BRPM | SYSTOLIC BLOOD PRESSURE: 141 MMHG

## 2024-06-18 DIAGNOSIS — C61 MALIGNANT NEOPLASM OF PROSTATE: ICD-10-CM

## 2024-06-18 DIAGNOSIS — N40.0 BENIGN PROSTATIC HYPERPLASIA WITHOUT LOWER URINARY TRACT SYMPTOMS: Chronic | ICD-10-CM

## 2024-06-18 DIAGNOSIS — N17.9 ACUTE KIDNEY FAILURE, UNSPECIFIED: ICD-10-CM

## 2024-06-18 DIAGNOSIS — E87.5 HYPERKALEMIA: ICD-10-CM

## 2024-06-18 DIAGNOSIS — I10 ESSENTIAL (PRIMARY) HYPERTENSION: ICD-10-CM

## 2024-06-18 DIAGNOSIS — Z98.89 OTHER SPECIFIED POSTPROCEDURAL STATES: Chronic | ICD-10-CM

## 2024-06-18 DIAGNOSIS — Z90.79 ACQUIRED ABSENCE OF OTHER GENITAL ORGAN(S): Chronic | ICD-10-CM

## 2024-06-18 DIAGNOSIS — E11.9 TYPE 2 DIABETES MELLITUS WITHOUT COMPLICATIONS: ICD-10-CM

## 2024-06-18 DIAGNOSIS — R06.02 SHORTNESS OF BREATH: ICD-10-CM

## 2024-06-18 DIAGNOSIS — Z98.49 CATARACT EXTRACTION STATUS, UNSPECIFIED EYE: Chronic | ICD-10-CM

## 2024-06-18 DIAGNOSIS — D64.9 ANEMIA, UNSPECIFIED: ICD-10-CM

## 2024-06-18 DIAGNOSIS — Z90.49 ACQUIRED ABSENCE OF OTHER SPECIFIED PARTS OF DIGESTIVE TRACT: Chronic | ICD-10-CM

## 2024-06-18 LAB
ADD ON TEST-SPECIMEN IN LAB: SIGNIFICANT CHANGE UP
ALBUMIN SERPL ELPH-MCNC: 3.8 G/DL — SIGNIFICANT CHANGE UP (ref 3.3–5)
ALP SERPL-CCNC: 88 U/L — SIGNIFICANT CHANGE UP (ref 40–120)
ALT FLD-CCNC: 19 U/L — SIGNIFICANT CHANGE UP (ref 4–41)
ANION GAP SERPL CALC-SCNC: 12 MMOL/L — SIGNIFICANT CHANGE UP (ref 7–14)
ANION GAP SERPL CALC-SCNC: 14 MMOL/L — SIGNIFICANT CHANGE UP (ref 7–14)
AST SERPL-CCNC: 21 U/L — SIGNIFICANT CHANGE UP (ref 4–40)
BASE EXCESS BLDV CALC-SCNC: -1.5 MMOL/L — SIGNIFICANT CHANGE UP (ref -2–3)
BASOPHILS # BLD AUTO: 0.04 K/UL — SIGNIFICANT CHANGE UP (ref 0–0.2)
BASOPHILS NFR BLD AUTO: 0.6 % — SIGNIFICANT CHANGE UP (ref 0–2)
BILIRUB SERPL-MCNC: 0.6 MG/DL — SIGNIFICANT CHANGE UP (ref 0.2–1.2)
BUN SERPL-MCNC: 60 MG/DL — HIGH (ref 7–23)
BUN SERPL-MCNC: 64 MG/DL — HIGH (ref 7–23)
CA-I SERPL-SCNC: 1.27 MMOL/L — SIGNIFICANT CHANGE UP (ref 1.15–1.33)
CALCIUM SERPL-MCNC: 9.5 MG/DL — SIGNIFICANT CHANGE UP (ref 8.4–10.5)
CALCIUM SERPL-MCNC: 9.6 MG/DL — SIGNIFICANT CHANGE UP (ref 8.4–10.5)
CHLORIDE BLDV-SCNC: 102 MMOL/L — SIGNIFICANT CHANGE UP (ref 96–108)
CHLORIDE SERPL-SCNC: 101 MMOL/L — SIGNIFICANT CHANGE UP (ref 98–107)
CHLORIDE SERPL-SCNC: 99 MMOL/L — SIGNIFICANT CHANGE UP (ref 98–107)
CO2 BLDV-SCNC: 25.7 MMOL/L — SIGNIFICANT CHANGE UP (ref 22–26)
CO2 SERPL-SCNC: 19 MMOL/L — LOW (ref 22–31)
CO2 SERPL-SCNC: 21 MMOL/L — LOW (ref 22–31)
CREAT SERPL-MCNC: 1.86 MG/DL — HIGH (ref 0.5–1.3)
CREAT SERPL-MCNC: 2.03 MG/DL — HIGH (ref 0.5–1.3)
EGFR: 30 ML/MIN/1.73M2 — LOW
EGFR: 34 ML/MIN/1.73M2 — LOW
EOSINOPHIL # BLD AUTO: 0.06 K/UL — SIGNIFICANT CHANGE UP (ref 0–0.5)
EOSINOPHIL NFR BLD AUTO: 0.9 % — SIGNIFICANT CHANGE UP (ref 0–6)
GAS PNL BLDV: 130 MMOL/L — LOW (ref 136–145)
GAS PNL BLDV: SIGNIFICANT CHANGE UP
GLUCOSE BLDC GLUCOMTR-MCNC: 194 MG/DL — HIGH (ref 70–99)
GLUCOSE BLDV-MCNC: 227 MG/DL — HIGH (ref 70–99)
GLUCOSE SERPL-MCNC: 216 MG/DL — HIGH (ref 70–99)
GLUCOSE SERPL-MCNC: 73 MG/DL — SIGNIFICANT CHANGE UP (ref 70–99)
HCO3 BLDV-SCNC: 24 MMOL/L — SIGNIFICANT CHANGE UP (ref 22–29)
HCT VFR BLD CALC: 35.4 % — LOW (ref 39–50)
HCT VFR BLDA CALC: 36 % — LOW (ref 39–51)
HGB BLD CALC-MCNC: 12.1 G/DL — LOW (ref 12.6–17.4)
HGB BLD-MCNC: 11.7 G/DL — LOW (ref 13–17)
IANC: 5.23 K/UL — SIGNIFICANT CHANGE UP (ref 1.8–7.4)
IMM GRANULOCYTES NFR BLD AUTO: 0.3 % — SIGNIFICANT CHANGE UP (ref 0–0.9)
LACTATE BLDV-MCNC: 1.5 MMOL/L — SIGNIFICANT CHANGE UP (ref 0.5–2)
LDH SERPL L TO P-CCNC: 331 U/L — HIGH (ref 135–225)
LYMPHOCYTES # BLD AUTO: 0.87 K/UL — LOW (ref 1–3.3)
LYMPHOCYTES # BLD AUTO: 13.4 % — SIGNIFICANT CHANGE UP (ref 13–44)
MAGNESIUM SERPL-MCNC: 1.9 MG/DL — SIGNIFICANT CHANGE UP (ref 1.6–2.6)
MCHC RBC-ENTMCNC: 28.5 PG — SIGNIFICANT CHANGE UP (ref 27–34)
MCHC RBC-ENTMCNC: 33.1 GM/DL — SIGNIFICANT CHANGE UP (ref 32–36)
MCV RBC AUTO: 86.1 FL — SIGNIFICANT CHANGE UP (ref 80–100)
MONOCYTES # BLD AUTO: 0.28 K/UL — SIGNIFICANT CHANGE UP (ref 0–0.9)
MONOCYTES NFR BLD AUTO: 4.3 % — SIGNIFICANT CHANGE UP (ref 2–14)
NEUTROPHILS # BLD AUTO: 5.23 K/UL — SIGNIFICANT CHANGE UP (ref 1.8–7.4)
NEUTROPHILS NFR BLD AUTO: 80.5 % — HIGH (ref 43–77)
NRBC # BLD: 0 /100 WBCS — SIGNIFICANT CHANGE UP (ref 0–0)
NRBC # FLD: 0 K/UL — SIGNIFICANT CHANGE UP (ref 0–0)
NT-PROBNP SERPL-SCNC: 5769 PG/ML — HIGH
PCO2 BLDV: 44 MMHG — SIGNIFICANT CHANGE UP (ref 42–55)
PH BLDV: 7.35 — SIGNIFICANT CHANGE UP (ref 7.32–7.43)
PHOSPHATE SERPL-MCNC: 3.8 MG/DL — SIGNIFICANT CHANGE UP (ref 2.5–4.5)
PLATELET # BLD AUTO: 178 K/UL — SIGNIFICANT CHANGE UP (ref 150–400)
PO2 BLDV: 20 MMHG — LOW (ref 25–45)
POTASSIUM BLDV-SCNC: 6.1 MMOL/L — HIGH (ref 3.5–5.1)
POTASSIUM SERPL-MCNC: 4.3 MMOL/L — SIGNIFICANT CHANGE UP (ref 3.5–5.3)
POTASSIUM SERPL-MCNC: 6.1 MMOL/L — HIGH (ref 3.5–5.3)
POTASSIUM SERPL-SCNC: 4.3 MMOL/L — SIGNIFICANT CHANGE UP (ref 3.5–5.3)
POTASSIUM SERPL-SCNC: 6.1 MMOL/L — HIGH (ref 3.5–5.3)
PROT SERPL-MCNC: 7.7 G/DL — SIGNIFICANT CHANGE UP (ref 6–8.3)
RBC # BLD: 4.11 M/UL — LOW (ref 4.2–5.8)
RBC # FLD: 15.1 % — HIGH (ref 10.3–14.5)
SAO2 % BLDV: 28.8 % — LOW (ref 67–88)
SODIUM SERPL-SCNC: 132 MMOL/L — LOW (ref 135–145)
SODIUM SERPL-SCNC: 134 MMOL/L — LOW (ref 135–145)
TROPONIN T, HIGH SENSITIVITY RESULT: 31 NG/L — SIGNIFICANT CHANGE UP
WBC # BLD: 6.5 K/UL — SIGNIFICANT CHANGE UP (ref 3.8–10.5)
WBC # FLD AUTO: 6.5 K/UL — SIGNIFICANT CHANGE UP (ref 3.8–10.5)

## 2024-06-18 PROCEDURE — 99223 1ST HOSP IP/OBS HIGH 75: CPT

## 2024-06-18 PROCEDURE — 71046 X-RAY EXAM CHEST 2 VIEWS: CPT | Mod: 26

## 2024-06-18 PROCEDURE — 76770 US EXAM ABDO BACK WALL COMP: CPT | Mod: 26

## 2024-06-18 PROCEDURE — 99291 CRITICAL CARE FIRST HOUR: CPT

## 2024-06-18 RX ORDER — SODIUM ZIRCONIUM CYCLOSILICATE 10 G/10G
10 POWDER, FOR SUSPENSION ORAL ONCE
Refills: 0 | Status: COMPLETED | OUTPATIENT
Start: 2024-06-18 | End: 2024-06-18

## 2024-06-18 RX ORDER — GLYBURIDE 5 MG
0 TABLET ORAL
Qty: 0 | Refills: 0 | DISCHARGE

## 2024-06-18 RX ORDER — DEXTROSE MONOHYDRATE AND SODIUM CHLORIDE 5; .3 G/100ML; G/100ML
1000 INJECTION, SOLUTION INTRAVENOUS
Refills: 0 | Status: DISCONTINUED | OUTPATIENT
Start: 2024-06-18 | End: 2024-06-19

## 2024-06-18 RX ORDER — DEXTROSE MONOHYDRATE 100 MG/ML
125 INJECTION, SOLUTION INTRAVENOUS ONCE
Refills: 0 | Status: DISCONTINUED | OUTPATIENT
Start: 2024-06-18 | End: 2024-06-19

## 2024-06-18 RX ORDER — DEXTROSE 30 % IN WATER 30 %
25 VIAL (ML) INTRAVENOUS ONCE
Refills: 0 | Status: DISCONTINUED | OUTPATIENT
Start: 2024-06-18 | End: 2024-06-19

## 2024-06-18 RX ORDER — GLUCAGON HYDROCHLORIDE 1 MG/ML
1 INJECTION, POWDER, FOR SOLUTION INTRAMUSCULAR; INTRAVENOUS; SUBCUTANEOUS ONCE
Refills: 0 | Status: DISCONTINUED | OUTPATIENT
Start: 2024-06-18 | End: 2024-06-19

## 2024-06-18 RX ORDER — DEXTROSE 30 % IN WATER 30 %
50 VIAL (ML) INTRAVENOUS ONCE
Refills: 0 | Status: COMPLETED | OUTPATIENT
Start: 2024-06-18 | End: 2024-06-18

## 2024-06-18 RX ORDER — INSULIN LISPRO 100 [IU]/ML
INJECTION, SOLUTION SUBCUTANEOUS
Refills: 0 | Status: DISCONTINUED | OUTPATIENT
Start: 2024-06-18 | End: 2024-06-19

## 2024-06-18 RX ORDER — ALLOPURINOL 300 MG/1
300 TABLET ORAL DAILY
Refills: 0 | Status: DISCONTINUED | OUTPATIENT
Start: 2024-06-18 | End: 2024-06-19

## 2024-06-18 RX ORDER — ALLOPURINOL 300 MG
1 TABLET ORAL
Qty: 0 | Refills: 0 | DISCHARGE

## 2024-06-18 RX ORDER — CALCIUM GLUCONATE 98 MG/ML
1 INJECTION, SOLUTION INTRAVENOUS ONCE
Refills: 0 | Status: COMPLETED | OUTPATIENT
Start: 2024-06-18 | End: 2024-06-18

## 2024-06-18 RX ORDER — DEXTROSE 30 % IN WATER 30 %
15 VIAL (ML) INTRAVENOUS ONCE
Refills: 0 | Status: DISCONTINUED | OUTPATIENT
Start: 2024-06-18 | End: 2024-06-19

## 2024-06-18 RX ORDER — INSULIN REGULAR, HUMAN 100/ML
5 VIAL (ML) INJECTION ONCE
Refills: 0 | Status: COMPLETED | OUTPATIENT
Start: 2024-06-18 | End: 2024-06-18

## 2024-06-18 RX ORDER — FUROSEMIDE 10 MG/ML
40 INJECTION, SOLUTION INTRAMUSCULAR; INTRAVENOUS ONCE
Refills: 0 | Status: COMPLETED | OUTPATIENT
Start: 2024-06-18 | End: 2024-06-18

## 2024-06-18 RX ORDER — DEXTROSE 30 % IN WATER 30 %
12.5 VIAL (ML) INTRAVENOUS ONCE
Refills: 0 | Status: DISCONTINUED | OUTPATIENT
Start: 2024-06-18 | End: 2024-06-19

## 2024-06-18 RX ORDER — GLYBURIDE 5 MG
0 TABLET ORAL
Refills: 0 | DISCHARGE

## 2024-06-18 RX ADMIN — Medication 50 MILLILITER(S): at 12:36

## 2024-06-18 RX ADMIN — CALCIUM GLUCONATE 100 GRAM(S): 98 INJECTION, SOLUTION INTRAVENOUS at 12:34

## 2024-06-18 RX ADMIN — Medication 5 UNIT(S): at 12:36

## 2024-06-18 RX ADMIN — SODIUM ZIRCONIUM CYCLOSILICATE 10 GRAM(S): 10 POWDER, FOR SUSPENSION ORAL at 12:36

## 2024-06-18 RX ADMIN — INSULIN LISPRO 1: 100 INJECTION, SOLUTION SUBCUTANEOUS at 21:48

## 2024-06-18 RX ADMIN — FUROSEMIDE 40 MILLIGRAM(S): 10 INJECTION, SOLUTION INTRAMUSCULAR; INTRAVENOUS at 14:59

## 2024-06-19 ENCOUNTER — TRANSCRIPTION ENCOUNTER (OUTPATIENT)
Age: 89
End: 2024-06-19

## 2024-06-19 ENCOUNTER — RESULT REVIEW (OUTPATIENT)
Age: 89
End: 2024-06-19

## 2024-06-19 VITALS
SYSTOLIC BLOOD PRESSURE: 134 MMHG | DIASTOLIC BLOOD PRESSURE: 70 MMHG | OXYGEN SATURATION: 98 % | RESPIRATION RATE: 18 BRPM | HEART RATE: 91 BPM | TEMPERATURE: 99 F

## 2024-06-19 DIAGNOSIS — N18.9 CHRONIC KIDNEY DISEASE, UNSPECIFIED: ICD-10-CM

## 2024-06-19 LAB
A1C WITH ESTIMATED AVERAGE GLUCOSE RESULT: 6.7 % — HIGH (ref 4–5.6)
ANION GAP SERPL CALC-SCNC: 14 MMOL/L — SIGNIFICANT CHANGE UP (ref 7–14)
BUN SERPL-MCNC: 66 MG/DL — HIGH (ref 7–23)
CALCIUM SERPL-MCNC: 9.6 MG/DL — SIGNIFICANT CHANGE UP (ref 8.4–10.5)
CHLORIDE SERPL-SCNC: 100 MMOL/L — SIGNIFICANT CHANGE UP (ref 98–107)
CHLORIDE UR-SCNC: 71 MMOL/L — SIGNIFICANT CHANGE UP
CO2 SERPL-SCNC: 23 MMOL/L — SIGNIFICANT CHANGE UP (ref 22–31)
CREAT SERPL-MCNC: 2.1 MG/DL — HIGH (ref 0.5–1.3)
EGFR: 29 ML/MIN/1.73M2 — LOW
ESTIMATED AVERAGE GLUCOSE: 146 — SIGNIFICANT CHANGE UP
FERRITIN SERPL-MCNC: 106 NG/ML — SIGNIFICANT CHANGE UP (ref 30–400)
GLUCOSE BLDC GLUCOMTR-MCNC: 138 MG/DL — HIGH (ref 70–99)
GLUCOSE SERPL-MCNC: 134 MG/DL — HIGH (ref 70–99)
HCT VFR BLD CALC: 35.7 % — LOW (ref 39–50)
HGB BLD-MCNC: 12.1 G/DL — LOW (ref 13–17)
IRON SATN MFR SERPL: 17 % — SIGNIFICANT CHANGE UP (ref 14–50)
IRON SATN MFR SERPL: 42 UG/DL — LOW (ref 45–165)
MAGNESIUM SERPL-MCNC: 1.9 MG/DL — SIGNIFICANT CHANGE UP (ref 1.6–2.6)
MCHC RBC-ENTMCNC: 29.1 PG — SIGNIFICANT CHANGE UP (ref 27–34)
MCHC RBC-ENTMCNC: 33.9 GM/DL — SIGNIFICANT CHANGE UP (ref 32–36)
MCV RBC AUTO: 85.8 FL — SIGNIFICANT CHANGE UP (ref 80–100)
NRBC # BLD: 0 /100 WBCS — SIGNIFICANT CHANGE UP (ref 0–0)
NRBC # FLD: 0 K/UL — SIGNIFICANT CHANGE UP (ref 0–0)
PHOSPHATE SERPL-MCNC: 4.9 MG/DL — HIGH (ref 2.5–4.5)
PLATELET # BLD AUTO: 175 K/UL — SIGNIFICANT CHANGE UP (ref 150–400)
POTASSIUM SERPL-MCNC: 4.5 MMOL/L — SIGNIFICANT CHANGE UP (ref 3.5–5.3)
POTASSIUM SERPL-SCNC: 4.5 MMOL/L — SIGNIFICANT CHANGE UP (ref 3.5–5.3)
POTASSIUM UR-SCNC: 41.6 MMOL/L — SIGNIFICANT CHANGE UP
RBC # BLD: 4.16 M/UL — LOW (ref 4.2–5.8)
RBC # FLD: 15.5 % — HIGH (ref 10.3–14.5)
SODIUM SERPL-SCNC: 137 MMOL/L — SIGNIFICANT CHANGE UP (ref 135–145)
SODIUM UR-SCNC: 79 MMOL/L — SIGNIFICANT CHANGE UP
TIBC SERPL-MCNC: 242 UG/DL — SIGNIFICANT CHANGE UP (ref 220–430)
UIBC SERPL-MCNC: 200 UG/DL — SIGNIFICANT CHANGE UP (ref 110–370)
WBC # BLD: 5.93 K/UL — SIGNIFICANT CHANGE UP (ref 3.8–10.5)
WBC # FLD AUTO: 5.93 K/UL — SIGNIFICANT CHANGE UP (ref 3.8–10.5)

## 2024-06-19 PROCEDURE — 93306 TTE W/DOPPLER COMPLETE: CPT | Mod: 26

## 2024-06-19 PROCEDURE — 99233 SBSQ HOSP IP/OBS HIGH 50: CPT

## 2024-06-19 RX ORDER — LISINOPRIL 5 MG/1
10 TABLET ORAL DAILY
Refills: 0 | Status: DISCONTINUED | OUTPATIENT
Start: 2024-06-19 | End: 2024-06-19

## 2024-06-19 RX ADMIN — ALLOPURINOL 300 MILLIGRAM(S): 300 TABLET ORAL at 11:15

## 2024-06-19 RX ADMIN — INSULIN LISPRO 1: 100 INJECTION, SOLUTION SUBCUTANEOUS at 13:26

## 2024-07-16 ENCOUNTER — RESULT REVIEW (OUTPATIENT)
Age: 89
End: 2024-07-16

## 2024-07-16 ENCOUNTER — TRANSCRIPTION ENCOUNTER (OUTPATIENT)
Age: 89
End: 2024-07-16

## 2024-07-22 ENCOUNTER — TRANSCRIPTION ENCOUNTER (OUTPATIENT)
Age: 89
End: 2024-07-22

## 2024-08-16 ENCOUNTER — APPOINTMENT (OUTPATIENT)
Dept: ELECTROPHYSIOLOGY | Facility: CLINIC | Age: 89
End: 2024-08-16

## 2024-10-03 ENCOUNTER — EMERGENCY (EMERGENCY)
Facility: HOSPITAL | Age: 88
LOS: 1 days | Discharge: HOME | End: 2024-10-03
Attending: EMERGENCY MEDICINE | Admitting: EMERGENCY MEDICINE
Payer: MEDICARE

## 2024-10-03 VITALS
HEART RATE: 98 BPM | WEIGHT: 154.98 LBS | TEMPERATURE: 98 F | RESPIRATION RATE: 20 BRPM | DIASTOLIC BLOOD PRESSURE: 78 MMHG | SYSTOLIC BLOOD PRESSURE: 151 MMHG

## 2024-10-03 DIAGNOSIS — Z98.89 OTHER SPECIFIED POSTPROCEDURAL STATES: Chronic | ICD-10-CM

## 2024-10-03 DIAGNOSIS — N40.0 BENIGN PROSTATIC HYPERPLASIA WITHOUT LOWER URINARY TRACT SYMPTOMS: Chronic | ICD-10-CM

## 2024-10-03 DIAGNOSIS — Z90.79 ACQUIRED ABSENCE OF OTHER GENITAL ORGAN(S): Chronic | ICD-10-CM

## 2024-10-03 DIAGNOSIS — Z90.49 ACQUIRED ABSENCE OF OTHER SPECIFIED PARTS OF DIGESTIVE TRACT: Chronic | ICD-10-CM

## 2024-10-03 DIAGNOSIS — Z98.49 CATARACT EXTRACTION STATUS, UNSPECIFIED EYE: Chronic | ICD-10-CM

## 2024-10-03 PROCEDURE — 99284 EMERGENCY DEPT VISIT MOD MDM: CPT

## 2024-10-03 PROCEDURE — 93010 ELECTROCARDIOGRAM REPORT: CPT

## 2024-12-17 ENCOUNTER — APPOINTMENT (OUTPATIENT)
Dept: CARDIOTHORACIC SURGERY | Facility: CLINIC | Age: 88
End: 2024-12-17

## 2024-12-23 ENCOUNTER — OUTPATIENT (OUTPATIENT)
Dept: OUTPATIENT SERVICES | Facility: HOSPITAL | Age: 88
LOS: 1 days | End: 2024-12-23
Payer: MEDICARE

## 2024-12-23 ENCOUNTER — RESULT REVIEW (OUTPATIENT)
Age: 88
End: 2024-12-23

## 2024-12-23 DIAGNOSIS — Z98.89 OTHER SPECIFIED POSTPROCEDURAL STATES: Chronic | ICD-10-CM

## 2024-12-23 DIAGNOSIS — Z98.49 CATARACT EXTRACTION STATUS, UNSPECIFIED EYE: Chronic | ICD-10-CM

## 2024-12-23 DIAGNOSIS — N40.0 BENIGN PROSTATIC HYPERPLASIA WITHOUT LOWER URINARY TRACT SYMPTOMS: Chronic | ICD-10-CM

## 2024-12-23 DIAGNOSIS — Z90.79 ACQUIRED ABSENCE OF OTHER GENITAL ORGAN(S): Chronic | ICD-10-CM

## 2024-12-23 DIAGNOSIS — Z90.49 ACQUIRED ABSENCE OF OTHER SPECIFIED PARTS OF DIGESTIVE TRACT: Chronic | ICD-10-CM

## 2024-12-23 DIAGNOSIS — I35.0 NONRHEUMATIC AORTIC (VALVE) STENOSIS: ICD-10-CM

## 2024-12-23 PROCEDURE — 93306 TTE W/DOPPLER COMPLETE: CPT | Mod: 26

## 2024-12-23 PROCEDURE — 93306 TTE W/DOPPLER COMPLETE: CPT

## 2024-12-23 PROCEDURE — 93356 MYOCRD STRAIN IMG SPCKL TRCK: CPT

## 2024-12-26 ENCOUNTER — APPOINTMENT (OUTPATIENT)
Dept: CARDIOLOGY | Facility: CLINIC | Age: 88
End: 2024-12-26

## 2024-12-26 VITALS
DIASTOLIC BLOOD PRESSURE: 84 MMHG | HEART RATE: 82 BPM | OXYGEN SATURATION: 96 % | RESPIRATION RATE: 16 BRPM | SYSTOLIC BLOOD PRESSURE: 136 MMHG

## 2024-12-26 DIAGNOSIS — I42.9 CARDIOMYOPATHY, UNSPECIFIED: ICD-10-CM

## 2024-12-26 DIAGNOSIS — I48.92 UNSPECIFIED ATRIAL FLUTTER: ICD-10-CM

## 2024-12-26 DIAGNOSIS — R06.00 DYSPNEA, UNSPECIFIED: ICD-10-CM

## 2024-12-26 DIAGNOSIS — I34.0 NONRHEUMATIC MITRAL (VALVE) INSUFFICIENCY: ICD-10-CM

## 2024-12-26 DIAGNOSIS — R01.1 CARDIAC MURMUR, UNSPECIFIED: ICD-10-CM

## 2024-12-26 DIAGNOSIS — R93.1 ABNORMAL FINDINGS ON DIAGNOSTIC IMAGING OF HEART AND CORONARY CIRCULATION: ICD-10-CM

## 2024-12-26 PROCEDURE — G2211 COMPLEX E/M VISIT ADD ON: CPT

## 2024-12-26 PROCEDURE — 99205 OFFICE O/P NEW HI 60 MIN: CPT

## 2024-12-26 RX ORDER — APIXABAN 2.5 MG/1
2.5 TABLET, FILM COATED ORAL
Qty: 60 | Refills: 0 | Status: ACTIVE | COMMUNITY
Start: 2024-12-26

## 2024-12-26 RX ORDER — TAMSULOSIN HYDROCHLORIDE 0.4 MG/1
0.4 CAPSULE ORAL
Qty: 30 | Refills: 2 | Status: ACTIVE | COMMUNITY
Start: 2024-12-26

## 2024-12-26 RX ORDER — AMIODARONE HYDROCHLORIDE 100 MG/1
100 TABLET ORAL DAILY
Refills: 0 | Status: ACTIVE | COMMUNITY
Start: 2024-12-26

## 2024-12-26 RX ORDER — SODIUM ZIRCONIUM CYCLOSILICATE 10 G/10G
10 POWDER, FOR SUSPENSION ORAL
Refills: 0 | Status: ACTIVE | COMMUNITY
Start: 2024-12-26

## 2024-12-26 RX ORDER — FUROSEMIDE 20 MG/1
20 TABLET ORAL
Refills: 0 | Status: ACTIVE | COMMUNITY
Start: 2024-12-26

## 2025-01-04 PROBLEM — R93.1 ABNORMAL ECHOCARDIOGRAM: Status: ACTIVE | Noted: 2025-01-04

## 2025-01-04 PROBLEM — R01.1 HEART MURMUR: Status: ACTIVE | Noted: 2025-01-04

## 2025-01-04 PROBLEM — I42.9 CARDIOMYOPATHY, UNSPECIFIED: Status: ACTIVE | Noted: 2025-01-04

## 2025-01-04 PROBLEM — I48.92 ATRIAL FLUTTER: Status: ACTIVE | Noted: 2025-01-04

## 2025-01-04 PROBLEM — I34.0 MITRAL REGURGITATION: Status: ACTIVE | Noted: 2025-01-04

## 2025-01-04 PROBLEM — R06.00 DYSPNEA: Status: ACTIVE | Noted: 2025-01-04

## 2025-03-04 ENCOUNTER — EMERGENCY (EMERGENCY)
Facility: HOSPITAL | Age: 89
LOS: 1 days | Discharge: HOME | End: 2025-03-04
Attending: EMERGENCY MEDICINE | Admitting: EMERGENCY MEDICINE
Payer: MEDICARE

## 2025-03-04 VITALS
WEIGHT: 149.91 LBS | OXYGEN SATURATION: 100 % | HEIGHT: 70 IN | RESPIRATION RATE: 16 BRPM | SYSTOLIC BLOOD PRESSURE: 172 MMHG | DIASTOLIC BLOOD PRESSURE: 84 MMHG | HEART RATE: 79 BPM

## 2025-03-04 VITALS
HEART RATE: 73 BPM | SYSTOLIC BLOOD PRESSURE: 151 MMHG | RESPIRATION RATE: 18 BRPM | OXYGEN SATURATION: 100 % | DIASTOLIC BLOOD PRESSURE: 85 MMHG | TEMPERATURE: 98 F

## 2025-03-04 DIAGNOSIS — Z98.89 OTHER SPECIFIED POSTPROCEDURAL STATES: Chronic | ICD-10-CM

## 2025-03-04 DIAGNOSIS — N40.0 BENIGN PROSTATIC HYPERPLASIA WITHOUT LOWER URINARY TRACT SYMPTOMS: Chronic | ICD-10-CM

## 2025-03-04 DIAGNOSIS — Z90.79 ACQUIRED ABSENCE OF OTHER GENITAL ORGAN(S): Chronic | ICD-10-CM

## 2025-03-04 DIAGNOSIS — Z90.49 ACQUIRED ABSENCE OF OTHER SPECIFIED PARTS OF DIGESTIVE TRACT: Chronic | ICD-10-CM

## 2025-03-04 DIAGNOSIS — Z98.49 CATARACT EXTRACTION STATUS, UNSPECIFIED EYE: Chronic | ICD-10-CM

## 2025-03-04 PROCEDURE — 99284 EMERGENCY DEPT VISIT MOD MDM: CPT

## 2025-03-04 RX ORDER — LIDOCAINE HCL/EPINEPHRINE/PF 1 %-1:200K
1 AMPUL (ML) INJECTION ONCE
Refills: 0 | Status: COMPLETED | OUTPATIENT
Start: 2025-03-04 | End: 2025-03-04

## 2025-03-04 RX ORDER — TRANEXAMIC ACID 1000 MG/10
5 AMPUL (ML) INTRAVENOUS ONCE
Refills: 0 | Status: COMPLETED | OUTPATIENT
Start: 2025-03-04 | End: 2025-03-04

## 2025-03-04 RX ADMIN — Medication 5 MILLILITER(S): at 05:22

## 2025-03-04 NOTE — ED PROVIDER NOTE - NSFOLLOWUPINSTRUCTIONS_ED_ALL_ED_FT
Thank you for visiting the ED today!  You were seen for tongue bleeding after you sustained a tongue laceration. You had pressure applied to the tongue and medication as well.    Your bleeding stopped here. You can follow with your PCP for your next scheduled appointment.     Please return to the ED if the bleeding resumes, you become light headed, pass out, or develop chest pain.

## 2025-03-04 NOTE — ED ADULT NURSE REASSESSMENT NOTE - NS ED NURSE REASSESS COMMENT FT1
Pt states feeling a lot better, Bleeding has stopped. Gauze noted to be dry with no blood stains. Pt to be discharged. Pending DC instructions.

## 2025-03-04 NOTE — ED PROVIDER NOTE - CLINICAL SUMMARY MEDICAL DECISION MAKING FREE TEXT BOX
89-year-old male history of A-fib on Eliquis presenting for mild oozing of blood from his tongue after he bit it while eating at 6 PM.  Patient arrives hemodynamically stable.  On exam he has very mild faint wheezing over the superior aspect of his tongue just right laterally.  Airways patent.  Will try topical TXA packing with ice to attempt hemostasis.  If unsuccessful can consider lido with epi versus dental consultation.

## 2025-03-04 NOTE — ED ADULT NURSE NOTE - OBJECTIVE STATEMENT
89 year old AO4 ambulatory male coming in with c/o bleeding tongue after accidently biting it. States he is on eliquis and the bleeding has not stopped since 6pm last night, PMHx of HTN, HLD, prostate cancer. Pt has gone through multiple gauzes in attempts to stop the bleed. Medicated as per eMAR by ED Resident. Pending reassessment. Pt safety maintained.

## 2025-03-04 NOTE — ED PROVIDER NOTE - PHYSICAL EXAMINATION
GEN: Patient awake and alert. No acute distress, non-toxic. Well appearing.   Eyes: PERRLA b/l. EOMI, no scleral icterus, no conjunctival injection. Moist mucous membranes.  ENT: Small abrasion-type wound to anterior surface of the tongue with very faint mild oozing  CARDIAC: Normal S1, S2. No murmur, rubs, or gallops. No peripheral edema noted.  PULM: Speaking in full sentences. CTA B/L no wheeze, rales or rhonchi. No signs of respiratory distress, no accessory muscle usage or nasal flaring.  NEURO: A&Ox3, no focal neurological deficits

## 2025-03-04 NOTE — ED ADULT NURSE NOTE - NSFALLHARMRISKINTERV_ED_ALL_ED

## 2025-03-04 NOTE — ED PROVIDER NOTE - IV ALTEPLASE EXCL ABS HIDDEN
See anticoagulation encounter from today.  Waiting on pharmacist consult at this time.    Yaneth Wells RN  [facility]  [phone]      show

## 2025-03-04 NOTE — ED PROVIDER NOTE - PATIENT PORTAL LINK FT
You can access the FollowMyHealth Patient Portal offered by [facility] by registering at the following website: [email or web address]. By joining [employer]’s FollowMyHealth portal, you will also be able to view your health information using other applications (apps) compatible with our system.

## 2025-03-04 NOTE — ED PROVIDER NOTE - NSICDXPASTMEDICALHX_GEN_ALL_CORE_FT
PAST MEDICAL HISTORY:  Decreased hearing of right ear     Diabetes mellitus type II, diagnosed in approximately 2011    ED (erectile dysfunction)     Gout     Hyperlipidemia     Hypertension     Inguinal hernia right side in 2021    Perihepatic fluid collection 2021    Prostate cancer being monitored

## 2025-03-04 NOTE — ED ADULT TRIAGE NOTE - CHIEF COMPLAINT QUOTE
alert oriented c/o oral bleeding   bit tongue while he was eating  takes eliquis  bleeding since 1800

## 2025-03-04 NOTE — ED PROVIDER NOTE - PROGRESS NOTE DETAILS
Shima Andre, PGY-3 DO:  Patient bleeding stopped. Patient would like to go home at this time. Patient to follow with up with PCP at 9 am.

## 2025-03-04 NOTE — ED PROVIDER NOTE - CHIEF COMPLAINT
The patient is a 89y Male complaining of  The patient is a 89y Male complaining of bleeding from tongue.

## 2025-06-11 ENCOUNTER — OUTPATIENT (OUTPATIENT)
Dept: OUTPATIENT SERVICES | Facility: HOSPITAL | Age: 89
LOS: 1 days | End: 2025-06-11
Payer: MEDICARE

## 2025-06-11 DIAGNOSIS — N40.0 BENIGN PROSTATIC HYPERPLASIA WITHOUT LOWER URINARY TRACT SYMPTOMS: Chronic | ICD-10-CM

## 2025-06-11 DIAGNOSIS — Z98.49 CATARACT EXTRACTION STATUS, UNSPECIFIED EYE: Chronic | ICD-10-CM

## 2025-06-11 DIAGNOSIS — I35.0 NONRHEUMATIC AORTIC (VALVE) STENOSIS: ICD-10-CM

## 2025-06-11 DIAGNOSIS — Z90.79 ACQUIRED ABSENCE OF OTHER GENITAL ORGAN(S): Chronic | ICD-10-CM

## 2025-06-11 DIAGNOSIS — Z98.89 OTHER SPECIFIED POSTPROCEDURAL STATES: Chronic | ICD-10-CM

## 2025-06-11 DIAGNOSIS — Z90.49 ACQUIRED ABSENCE OF OTHER SPECIFIED PARTS OF DIGESTIVE TRACT: Chronic | ICD-10-CM

## 2025-06-12 ENCOUNTER — RESULT REVIEW (OUTPATIENT)
Age: 89
End: 2025-06-12

## 2025-06-12 ENCOUNTER — APPOINTMENT (OUTPATIENT)
Dept: CARDIOTHORACIC SURGERY | Facility: CLINIC | Age: 89
End: 2025-06-12

## 2025-06-12 ENCOUNTER — APPOINTMENT (OUTPATIENT)
Dept: CARDIOTHORACIC SURGERY | Facility: CLINIC | Age: 89
End: 2025-06-12
Payer: MEDICARE

## 2025-06-12 VITALS
DIASTOLIC BLOOD PRESSURE: 67 MMHG | OXYGEN SATURATION: 97 % | SYSTOLIC BLOOD PRESSURE: 130 MMHG | BODY MASS INDEX: 22.19 KG/M2 | WEIGHT: 155 LBS | HEIGHT: 70 IN | HEART RATE: 67 BPM | RESPIRATION RATE: 16 BRPM

## 2025-06-12 PROCEDURE — 99214 OFFICE O/P EST MOD 30 MIN: CPT

## 2025-06-12 PROCEDURE — 93306 TTE W/DOPPLER COMPLETE: CPT

## 2025-06-12 PROCEDURE — 93306 TTE W/DOPPLER COMPLETE: CPT | Mod: 26

## 2025-06-12 PROCEDURE — 93356 MYOCRD STRAIN IMG SPCKL TRCK: CPT

## 2025-06-12 PROCEDURE — G2211 COMPLEX E/M VISIT ADD ON: CPT

## 2025-06-12 RX ORDER — ENZALUTAMIDE 40 MG/1
40 TABLET ORAL
Refills: 0 | Status: ACTIVE | COMMUNITY
Start: 2025-06-12

## 2025-09-04 ENCOUNTER — NON-APPOINTMENT (OUTPATIENT)
Age: 89
End: 2025-09-04

## 2025-09-04 ENCOUNTER — APPOINTMENT (OUTPATIENT)
Dept: OPHTHALMOLOGY | Facility: CLINIC | Age: 89
End: 2025-09-04
Payer: MEDICARE

## 2025-09-04 PROCEDURE — 92134 CPTRZ OPH DX IMG PST SGM RTA: CPT

## 2025-09-04 PROCEDURE — 92014 COMPRE OPH EXAM EST PT 1/>: CPT
